# Patient Record
Sex: FEMALE | Race: WHITE | NOT HISPANIC OR LATINO | URBAN - METROPOLITAN AREA
[De-identification: names, ages, dates, MRNs, and addresses within clinical notes are randomized per-mention and may not be internally consistent; named-entity substitution may affect disease eponyms.]

---

## 2023-04-22 LAB
ALANINE AMINOTRANSFERASE (SGPT) (U/L) IN SER/PLAS: 16 U/L (ref 7–45)
ALBUMIN (G/DL) IN SER/PLAS: 4.2 G/DL (ref 3.4–5)
ALKALINE PHOSPHATASE (U/L) IN SER/PLAS: 51 U/L (ref 33–110)
ANION GAP IN SER/PLAS: 14 MMOL/L (ref 10–20)
ASPARTATE AMINOTRANSFERASE (SGOT) (U/L) IN SER/PLAS: 16 U/L (ref 9–39)
BILIRUBIN TOTAL (MG/DL) IN SER/PLAS: 0.7 MG/DL (ref 0–1.2)
CALCIDIOL (25 OH VITAMIN D3) (NG/ML) IN SER/PLAS: 16 NG/ML
CALCIUM (MG/DL) IN SER/PLAS: 9.1 MG/DL (ref 8.6–10.3)
CARBON DIOXIDE, TOTAL (MMOL/L) IN SER/PLAS: 24 MMOL/L (ref 21–32)
CHLORIDE (MMOL/L) IN SER/PLAS: 104 MMOL/L (ref 98–107)
CHOLESTEROL (MG/DL) IN SER/PLAS: 157 MG/DL (ref 0–199)
CHOLESTEROL IN HDL (MG/DL) IN SER/PLAS: 44.6 MG/DL
CHOLESTEROL/HDL RATIO: 3.5
CREATININE (MG/DL) IN SER/PLAS: 0.87 MG/DL (ref 0.5–1.05)
ERYTHROCYTE DISTRIBUTION WIDTH (RATIO) BY AUTOMATED COUNT: 13.8 % (ref 11.5–14.5)
ERYTHROCYTE MEAN CORPUSCULAR HEMOGLOBIN CONCENTRATION (G/DL) BY AUTOMATED: 31 G/DL (ref 32–36)
ERYTHROCYTE MEAN CORPUSCULAR VOLUME (FL) BY AUTOMATED COUNT: 82 FL (ref 80–100)
ERYTHROCYTES (10*6/UL) IN BLOOD BY AUTOMATED COUNT: 4.94 X10E12/L (ref 4–5.2)
GFR FEMALE: 85 ML/MIN/1.73M2
GLUCOSE (MG/DL) IN SER/PLAS: 89 MG/DL (ref 74–99)
HEMATOCRIT (%) IN BLOOD BY AUTOMATED COUNT: 40.7 % (ref 36–46)
HEMOGLOBIN (G/DL) IN BLOOD: 12.6 G/DL (ref 12–16)
LDL: 88 MG/DL (ref 0–99)
LEUKOCYTES (10*3/UL) IN BLOOD BY AUTOMATED COUNT: 8.3 X10E9/L (ref 4.4–11.3)
PLATELETS (10*3/UL) IN BLOOD AUTOMATED COUNT: 273 X10E9/L (ref 150–450)
POTASSIUM (MMOL/L) IN SER/PLAS: 3.7 MMOL/L (ref 3.5–5.3)
PROTEIN TOTAL: 7 G/DL (ref 6.4–8.2)
SODIUM (MMOL/L) IN SER/PLAS: 138 MMOL/L (ref 136–145)
THYROTROPIN (MIU/L) IN SER/PLAS BY DETECTION LIMIT <= 0.05 MIU/L: 4.48 MIU/L (ref 0.44–3.98)
TRIGLYCERIDE (MG/DL) IN SER/PLAS: 122 MG/DL (ref 0–149)
UREA NITROGEN (MG/DL) IN SER/PLAS: 14 MG/DL (ref 6–23)
VLDL: 24 MG/DL (ref 0–40)

## 2023-04-23 LAB
ALLERGEN ANIMAL: CAT DANDER IGE (KU/L): <0.1 KU/L
ALLERGEN ANIMAL: DOG DANDER IGE (KU/L): <0.1 KU/L
ALLERGEN GRASS: BERMUDA GRASS (CYNODON DACTYLON) IGE (KU/L): <0.1 KU/L
ALLERGEN GRASS: JOHNSON GRASS (SORGHUM HALEPENSE) IGE (KU/L): <0.1 KU/L
ALLERGEN GRASS: MEADOW GRASS, KENTUCKY BLUE (POA PRATENSIS )IGE (KU/L): <0.1 KU/L
ALLERGEN GRASS: TIMOTHY GRASS (PHLEUM PRATENSE) IGE (KU/L): <0.1 KU/L
ALLERGEN INSECT: COCKROACH IGE: <0.1 KU/L
ALLERGEN MICROORGANISM: ALTERNARIA ALTERNATA IGE (KU/L): <0.1 KU/L
ALLERGEN MICROORGANISM: ASPERGILLUS FUMIGATUS IGE (KU/L): <0.1 KU/L
ALLERGEN MICROORGANISM: CLADOSPORIUM HERBARUM IGE (KU/L): <0.1 KU/L
ALLERGEN MICROORGANISM: PENICILLIUM CHRYSOGENUM (P. NOTATUM) IGE (KU/L): <0.1 KU/L
ALLERGEN MITE: DERMATOPHAGOIDES FARINAE (HOUSE DUST MITE) IGE (KU/L): <0.1 KU/L
ALLERGEN MITE: DERMATOPHAGOIDES PTERONYSSINUS (HOUSE DUST MITE) IGE (KU/L): <0.1 KU/L
ALLERGEN TREE: BOX-ELDER (ACER NEGUNDO) IGE (KU/L): <0.1 KU/L
ALLERGEN TREE: COMMON SILVER BIRCH (BETULA VERRUCOSA) IGE (KU/L): <0.1 KU/L
ALLERGEN TREE: COTTONWOOD (POPULUS DELTOIDES) IGE (KU/L): <0.1 KU/L
ALLERGEN TREE: ELM (ULMUS AMERICANA) IGE (KU/L): <0.1 KU/L
ALLERGEN TREE: MAPLE LEAF SYCAMORE, LONDON PLANE IGE (KU/L): <0.1 KU/L
ALLERGEN TREE: MOUNTAIN JUNIPER (JUNIPERUS SABINOIDES) IGE (KU/L): <0.1 KU/L
ALLERGEN TREE: MULBERRY (MORUS ALBA) IGE (KU/L): <0.1 KU/L
ALLERGEN TREE: OAK (QUERCUS ALBA) IGE (KU/L): <0.1 KU/L
ALLERGEN TREE: PECAN, HICKORY (CARYA PECAN) IGE (KU/L): <0.1 KU/L
ALLERGEN TREE: WALNUT IGE: <0.1 KU/L
ALLERGEN TREE: WHITE ASH (FRAXINUS AMERICANA) IGE (KU/L): <0.1 KU/L
ALLERGEN WEED: COMMON PIGWEED (AMARANTHUS RETROFLEXUS) IGE (KU/L): <0.1 KU/L
ALLERGEN WEED: COMMON RAGWEED (AMB. ARTEMISIIFOLIA/A. ELATIOR) IGE (KU/L): <0.1 KU/L
ALLERGEN WEED: GOOSEFOOT, LAMB'S QUARTERS (CHENOPODIUM ALBUM) IGE (KU/L): <0.1 KU/L
ALLERGEN WEED: PLANTAIN (ENGLISH), RIBWORT (PLANTAGO LANCEOLATA) IGE (KU/L): <0.1 KU/L
ALLERGEN WEED: PRICKLY SALTWORT/RUSSIAN THISTLE (SALSOLA KALI) IGE (KU/L): <0.1 KU/L
ALLERGEN WEED: SHEEP SORREL (RUMEX ACETOSELLA) IGE (KU/L): <0.1 KU/L
IMMUNOCAP IGE: 40.9 KU/L (ref 0–214)
IMMUNOCAP INTERPRETATION: NORMAL

## 2023-10-31 PROBLEM — J45.998: Status: ACTIVE | Noted: 2023-10-31

## 2023-10-31 PROBLEM — E55.9 VITAMIN D DEFICIENCY: Status: ACTIVE | Noted: 2023-10-31

## 2023-10-31 PROBLEM — E66.01 MORBID OBESITY WITH BMI OF 45.0-49.9, ADULT (MULTI): Status: ACTIVE | Noted: 2023-10-31

## 2023-10-31 PROBLEM — F33.9 DEPRESSION, MAJOR, RECURRENT (CMS-HCC): Status: ACTIVE | Noted: 2023-10-31

## 2023-10-31 RX ORDER — SERTRALINE HYDROCHLORIDE 50 MG/1
50 TABLET, FILM COATED ORAL DAILY
COMMUNITY
End: 2024-03-20

## 2023-10-31 RX ORDER — ACETAMINOPHEN 500 MG
2000 TABLET ORAL DAILY
COMMUNITY

## 2023-10-31 RX ORDER — LORATADINE 10 MG
10 TABLET,DISINTEGRATING ORAL DAILY
COMMUNITY
End: 2024-04-23 | Stop reason: WASHOUT

## 2023-11-01 ENCOUNTER — OFFICE VISIT (OUTPATIENT)
Dept: PRIMARY CARE | Facility: CLINIC | Age: 43
End: 2023-11-01
Payer: COMMERCIAL

## 2023-11-01 VITALS
BODY MASS INDEX: 44.41 KG/M2 | HEART RATE: 96 BPM | DIASTOLIC BLOOD PRESSURE: 80 MMHG | HEIGHT: 68 IN | TEMPERATURE: 98 F | SYSTOLIC BLOOD PRESSURE: 128 MMHG | WEIGHT: 293 LBS

## 2023-11-01 DIAGNOSIS — J30.9 ALLERGIC RHINITIS, UNSPECIFIED SEASONALITY, UNSPECIFIED TRIGGER: ICD-10-CM

## 2023-11-01 DIAGNOSIS — N30.00 ACUTE CYSTITIS WITHOUT HEMATURIA: ICD-10-CM

## 2023-11-01 DIAGNOSIS — R39.9 UTI SYMPTOMS: Primary | ICD-10-CM

## 2023-11-01 DIAGNOSIS — Q62.8 CONGENITAL ABNORMALITY OF URETER: ICD-10-CM

## 2023-11-01 DIAGNOSIS — Z87.440 HISTORY OF RECURRENT UTIS: ICD-10-CM

## 2023-11-01 LAB
POC APPEARANCE, URINE: CLEAR
POC BILIRUBIN, URINE: NEGATIVE
POC BLOOD, URINE: NEGATIVE
POC COLOR, URINE: YELLOW
POC GLUCOSE, URINE: NEGATIVE MG/DL
POC KETONES, URINE: NEGATIVE MG/DL
POC LEUKOCYTES, URINE: ABNORMAL
POC NITRITE,URINE: NEGATIVE
POC PH, URINE: 5 PH
POC PROTEIN, URINE: ABNORMAL MG/DL
POC SPECIFIC GRAVITY, URINE: 1.01
POC UROBILINOGEN, URINE: 0.2 EU/DL

## 2023-11-01 PROCEDURE — 87086 URINE CULTURE/COLONY COUNT: CPT

## 2023-11-01 PROCEDURE — 81002 URINALYSIS NONAUTO W/O SCOPE: CPT | Performed by: STUDENT IN AN ORGANIZED HEALTH CARE EDUCATION/TRAINING PROGRAM

## 2023-11-01 PROCEDURE — 1036F TOBACCO NON-USER: CPT | Performed by: STUDENT IN AN ORGANIZED HEALTH CARE EDUCATION/TRAINING PROGRAM

## 2023-11-01 PROCEDURE — 99214 OFFICE O/P EST MOD 30 MIN: CPT | Performed by: STUDENT IN AN ORGANIZED HEALTH CARE EDUCATION/TRAINING PROGRAM

## 2023-11-01 ASSESSMENT — ENCOUNTER SYMPTOMS
OCCASIONAL FEELINGS OF UNSTEADINESS: 0
DEPRESSION: 0
LOSS OF SENSATION IN FEET: 0

## 2023-11-01 ASSESSMENT — PATIENT HEALTH QUESTIONNAIRE - PHQ9
SUM OF ALL RESPONSES TO PHQ9 QUESTIONS 1 AND 2: 0
2. FEELING DOWN, DEPRESSED OR HOPELESS: NOT AT ALL
1. LITTLE INTEREST OR PLEASURE IN DOING THINGS: NOT AT ALL

## 2023-11-01 NOTE — PROGRESS NOTES
"Subjective   Patient ID: Emelia Aponte is a 42 y.o. female who presents for UTI (Patient is here in office today with complaint of possible UTI, she has been experiencing frequency and urgency and bladder spasms since Sunday. Yaima has increased her water intake and is taking cranberry and pyridium ).    Denies fever, abd pain, n/v, back pain  Has history of ureter abnormality that has consistently caused reflux of urine and UTIs    Plan  Urine culture, wait to see if it grows anything  Referral to urology for second opinion'  Referral to allergy  Follow-up with results    UTI        Review of Systems    Objective   /80 (BP Location: Left arm, Patient Position: Sitting, BP Cuff Size: Adult)   Pulse 96   Temp 36.7 °C (98 °F) (Temporal)   Ht 1.727 m (5' 8\")   Wt 136 kg (299 lb)   BMI 45.46 kg/m²     Physical Exam  Vitals reviewed.   Constitutional:       General: She is not in acute distress.     Appearance: Normal appearance. She is not ill-appearing or toxic-appearing.   HENT:      Head: Atraumatic.      Mouth/Throat:      Mouth: Mucous membranes are moist.      Pharynx: Oropharynx is clear.   Eyes:      Extraocular Movements: Extraocular movements intact.      Conjunctiva/sclera: Conjunctivae normal.      Pupils: Pupils are equal, round, and reactive to light.   Cardiovascular:      Rate and Rhythm: Normal rate and regular rhythm.      Pulses: Normal pulses.      Heart sounds: Normal heart sounds. No murmur heard.  Pulmonary:      Effort: Pulmonary effort is normal. No respiratory distress.      Breath sounds: Normal breath sounds.   Abdominal:      General: Abdomen is flat.      Palpations: Abdomen is soft.      Tenderness: There is no abdominal tenderness.   Musculoskeletal:      Right lower leg: No edema.      Left lower leg: No edema.   Skin:     General: Skin is warm and dry.      Capillary Refill: Capillary refill takes less than 2 seconds.      Findings: No rash.   Neurological:      General: " No focal deficit present.      Mental Status: She is alert.      Cranial Nerves: No cranial nerve deficit.      Gait: Gait normal.   Psychiatric:         Mood and Affect: Mood normal.         Behavior: Behavior normal.       Assessment/Plan   Problem List Items Addressed This Visit    None  Visit Diagnoses         Codes    UTI symptoms    -  Primary R39.9    Relevant Orders    POCT UA (nonautomated) manually resulted

## 2023-11-02 LAB — BACTERIA UR CULT: NORMAL

## 2023-11-13 ENCOUNTER — OFFICE VISIT (OUTPATIENT)
Dept: UROLOGY | Facility: CLINIC | Age: 43
End: 2023-11-13
Payer: COMMERCIAL

## 2023-11-13 VITALS
WEIGHT: 290 LBS | DIASTOLIC BLOOD PRESSURE: 85 MMHG | SYSTOLIC BLOOD PRESSURE: 127 MMHG | TEMPERATURE: 97.7 F | BODY MASS INDEX: 43.95 KG/M2 | HEIGHT: 68 IN | HEART RATE: 76 BPM

## 2023-11-13 DIAGNOSIS — Z87.448 HISTORY OF URINARY REFLUX: ICD-10-CM

## 2023-11-13 DIAGNOSIS — Z87.440 HISTORY OF RECURRENT UTIS: Primary | ICD-10-CM

## 2023-11-13 DIAGNOSIS — N39.3 SUI (STRESS URINARY INCONTINENCE, FEMALE): ICD-10-CM

## 2023-11-13 DIAGNOSIS — Q62.8 CONGENITAL ABNORMALITY OF URETER: ICD-10-CM

## 2023-11-13 LAB
APPEARANCE UR: CLEAR
BACTERIA #/AREA URNS AUTO: ABNORMAL /HPF
BILIRUB UR STRIP.AUTO-MCNC: NEGATIVE MG/DL
COLOR UR: ABNORMAL
GLUCOSE UR STRIP.AUTO-MCNC: NEGATIVE MG/DL
KETONES UR STRIP.AUTO-MCNC: NEGATIVE MG/DL
LEUKOCYTE ESTERASE UR QL STRIP.AUTO: ABNORMAL
NITRITE UR QL STRIP.AUTO: NEGATIVE
PH UR STRIP.AUTO: 6 [PH]
POC APPEARANCE, URINE: CLEAR
POC BILIRUBIN, URINE: NEGATIVE
POC BLOOD, URINE: ABNORMAL
POC COLOR, URINE: YELLOW
POC GLUCOSE, URINE: NEGATIVE MG/DL
POC KETONES, URINE: NEGATIVE MG/DL
POC LEUKOCYTES, URINE: NEGATIVE
POC NITRITE,URINE: NEGATIVE
POC PH, URINE: 6 PH
POC PROTEIN, URINE: NEGATIVE MG/DL
POC SPECIFIC GRAVITY, URINE: 1.01
POC UROBILINOGEN, URINE: 0.2 EU/DL
PROT UR STRIP.AUTO-MCNC: NEGATIVE MG/DL
RBC # UR STRIP.AUTO: NEGATIVE /UL
RBC #/AREA URNS AUTO: ABNORMAL /HPF
SP GR UR STRIP.AUTO: 1.01
SQUAMOUS #/AREA URNS AUTO: ABNORMAL /HPF
UROBILINOGEN UR STRIP.AUTO-MCNC: <2 MG/DL
WBC #/AREA URNS AUTO: ABNORMAL /HPF

## 2023-11-13 PROCEDURE — 81003 URINALYSIS AUTO W/O SCOPE: CPT | Performed by: NURSE PRACTITIONER

## 2023-11-13 PROCEDURE — 99204 OFFICE O/P NEW MOD 45 MIN: CPT | Performed by: NURSE PRACTITIONER

## 2023-11-13 PROCEDURE — 51798 US URINE CAPACITY MEASURE: CPT | Performed by: NURSE PRACTITIONER

## 2023-11-13 PROCEDURE — 1036F TOBACCO NON-USER: CPT | Performed by: NURSE PRACTITIONER

## 2023-11-13 PROCEDURE — 81001 URINALYSIS AUTO W/SCOPE: CPT

## 2023-11-13 NOTE — PROGRESS NOTES
"11/13/23   [id number]    Double ureter, congenital reflux     Subjective      HPI Emelia Aponte is a 42 y.o. female who presents for double ureter, congenital reflux; hx partial nephrectomy at age 16; Hx recurrent UTIs but none in past 2 years; sx w UTIs more subtle w urgency and bladder spasms; Recently experienced one day of urgency, saw primary care, urine negative for infection, she had drank a lot of water and cranberry perhaps flushed it out;     Saw Dr. Barragan and Dr. Ingram in the past many IVP, MACY and cystoscopy in past per patient; most recently told the ureters had fused nothing could be done at this time; she wanted second opinion for monitoring;     No OAB, DTF 5-6 x. NTF 1 x. No hematuria, occasional ALTON if cough hard, seeing allergist to address upper respiratory symptoms; not bother some enough for treatment; no constipation;     Not sexually active, neg HSDD;  perimenopausal, heavy menses now but still regular, no burning;     FH mother cervical ca, father passed from testicular ca  SH: quit smoking April 2022, smoked < 1/2 ppd x 20 yrs;       Creatinine 0.87        Objective     /85 (BP Location: Right arm, Patient Position: Sitting, BP Cuff Size: Large adult)   Pulse 76   Temp 36.5 °C (97.7 °F) (Temporal)   Ht 1.727 m (5' 8\")   Wt 132 kg (290 lb)   BMI 44.09 kg/m²    Physical Exam    General: Appears comfortable and in no apparent distress, well nourished  Head: Normocephalic, atraumatic  Neck: trachea midline  Respiratory: respirations unlabored, no wheezes, and no use of accessory muscles  Cardiovascular: at rest no dyspnea, well perfused  Skin: no visible rashes or lesions  Neurologic: grossly intact, oriented to person, place, and time  Psychiatric: mood and affect appropriate  Musculoskeletal: in chair for appt. no difficulty w upper body movement    Assessment/Plan   Problem List Items Addressed This Visit    None  Visit Diagnoses       History of recurrent UTIs    -  Primary    " Relevant Orders    POCT UA Automated manually resulted (Completed)    Post-Void Residual (Completed)    Urinalysis with Reflex Microscopic    Congenital abnormality of ureter        Relevant Orders    POCT UA Automated manually resulted (Completed)    Urinalysis with Reflex Microscopic          Orders Placed This Encounter   Procedures    Post-Void Residual    Urinalysis with Reflex Microscopic     Standing Status:   Future     Number of Occurrences:   1     Standing Expiration Date:   11/13/2024     Order Specific Question:   Release result to MyChart     Answer:   Immediate [1]    POCT UA Automated manually resulted     Order Specific Question:   Release result to MyChart     Answer:   Immediate [1]         Discussed UTI prevention, drinking fluids, Dmanose   eventually as she moves thru perimenopause, consider estrogen vaginal cream    Discussed treatment options Stress urinary incontinence, kegels, pelvic floor PT, pessary, bulking agent, sling  Not bothersome enough for treatment at this time    Discussed double ureter, per patient fused to other ureter w clip now;   hx reflux, has not had recent imaging, will arrange appt. W Dr. Ontiveros discuss best imaging a  Many IVP and MACY, cystoscopy in past per patient, would prefer not to be poked and prodded per patient,     Nurse line [phone]      Renée Matta, APRN-CNP  Lab Results   Component Value Date    GLUCOSE 89 04/22/2023    CALCIUM 9.1 04/22/2023     04/22/2023    K 3.7 04/22/2023    CO2 24 04/22/2023     04/22/2023    BUN 14 04/22/2023    CREATININE 0.87 04/22/2023

## 2023-11-13 NOTE — PATIENT INSTRUCTIONS
Discussed UTI prevention, drinking fluids, Dmanose   eventually as she moves thru perimenopause, consider estrogen vaginal cream    Discussed treatment options Stress urinary incontinence, kegels, pelvic floor PT, pessary, bulking agent, sling  Not bothersome enough for treatment at this time    Discussed double ureter, per patient fused to other ureter w clip now;   hx reflux, has not had recent imaging, will arrange appt. W Dr. Ontiveros discuss best imaging a  Many IVP and MACY, cystoscopy in past per patient, would prefer not to be poked and prodded per patient,     Nurse line [phone]

## 2023-11-13 NOTE — Clinical Note
Please review, let me know if you want any particular testing before she sees you, she has been poked prodded a bit and wants to make sure best test ordered. ty

## 2023-11-14 ENCOUNTER — TELEPHONE (OUTPATIENT)
Dept: UROLOGY | Facility: CLINIC | Age: 43
End: 2023-11-14
Payer: COMMERCIAL

## 2023-11-14 NOTE — TELEPHONE ENCOUNTER
----- Message from JEFFERY Blevins sent at 11/14/2023  8:03 AM EST -----  Please let her know, no hematuria, had dipped in office but neg at hospital.    ----- Message -----  From: Malvin Johnson MA  Sent: 11/13/2023   3:04 PM EST  To: JEFFERY Blevins

## 2023-12-20 ENCOUNTER — APPOINTMENT (OUTPATIENT)
Dept: UROLOGY | Facility: CLINIC | Age: 43
End: 2023-12-20
Payer: COMMERCIAL

## 2024-02-05 ENCOUNTER — CONSULT (OUTPATIENT)
Dept: ALLERGY | Facility: CLINIC | Age: 44
End: 2024-02-05
Payer: COMMERCIAL

## 2024-02-05 VITALS — OXYGEN SATURATION: 97 % | WEIGHT: 293 LBS | BODY MASS INDEX: 44.55 KG/M2 | HEART RATE: 80 BPM

## 2024-02-05 DIAGNOSIS — R06.2 WHEEZE: Primary | ICD-10-CM

## 2024-02-05 DIAGNOSIS — J30.9 ALLERGIC RHINITIS, UNSPECIFIED SEASONALITY, UNSPECIFIED TRIGGER: ICD-10-CM

## 2024-02-05 LAB
FEV1/FVC: 101 %
FEV1: 76 LITERS
FVC: 74 LITERS

## 2024-02-05 PROCEDURE — 99204 OFFICE O/P NEW MOD 45 MIN: CPT | Performed by: ALLERGY & IMMUNOLOGY

## 2024-02-05 PROCEDURE — 94060 EVALUATION OF WHEEZING: CPT | Performed by: ALLERGY & IMMUNOLOGY

## 2024-02-05 PROCEDURE — 95004 PERQ TESTS W/ALRGNC XTRCS: CPT | Performed by: ALLERGY & IMMUNOLOGY

## 2024-02-05 PROCEDURE — 95024 IQ TESTS W/ALLERGENIC XTRCS: CPT | Performed by: ALLERGY & IMMUNOLOGY

## 2024-02-05 RX ORDER — MONTELUKAST SODIUM 10 MG/1
10 TABLET ORAL DAILY
Qty: 30 TABLET | Refills: 11 | Status: SHIPPED | OUTPATIENT
Start: 2024-02-05 | End: 2025-02-04

## 2024-02-05 RX ORDER — HYDROXYZINE HYDROCHLORIDE 25 MG/1
25 TABLET, FILM COATED ORAL DAILY PRN
COMMUNITY
Start: 2022-08-31 | End: 2024-04-23 | Stop reason: WASHOUT

## 2024-02-05 RX ORDER — MOMETASONE FUROATE 50 UG/1
2 SPRAY, METERED NASAL 2 TIMES DAILY
Qty: 17 G | Refills: 5 | Status: SHIPPED | OUTPATIENT
Start: 2024-02-05 | End: 2025-02-04

## 2024-02-05 NOTE — PROGRESS NOTES
"    Subjective   Patient ID:   [id number]   Emelia Aponte is a 43 y.o. female who presents for Allergies (Pt had allergy bw done 4/2023 by ), Cough, Breathing Problem (NPV), Sinus Problem, and Sinusitis.    Chief Complaint   Patient presents with    Allergies     Pt had allergy bw done 4/2023 by     Cough    Breathing Problem     NPV    Sinus Problem    Sinusitis      This is a new patient presenting for evaluation of allergy Sx     Patient states she has suffered from chronic nasal congestion for years.  About 15 years ago, she lived in a home with mold and told to take medications, which did not help.  She moved and Sx persist.  Dec 2022, she developed a URI and since then, she has lung issues including SOB and wheeze on inhalation and expiration.  She notes in October, November, December and January, she had \"sinus infections\" and green nasal phlegm with nose-blowing.  She was given prednisone x5 days, azelastine and doxycycline x1 week the end of last month.  Patient notes she was typically good for 2 weeks but Sx recur.  She stopped the azelastine because it makes her sneeze.   She has 3 cats and a dog and had a RAP 04- that was negative.  She has persistent intermittent wheeze at night when she lies down.    Patient saw her PCP who ordered a PFT she could not afford so she was prescribed albuterol 2 inhalations prior to activity and as needed.  She states she cannot even walk her dog.  She uses the albuterol about 2-3x daily as prevention.    Patient has not had cultures or scans but had a negative CXR recently.  She has intermittent indigestion but not a true heartburn.  She notes FMHx of COPD and asthma.  She wonders if she may have EIA and concerned because she is a former smoker.    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    Review of Systems    Objective     Pulse 80   Wt 133 kg (293 lb)   SpO2 97%   BMI 44.55 kg/m²      Physical Exam  Constitutional:       Appearance: Normal appearance. "   HENT:      Head: Normocephalic and atraumatic.      Right Ear: External ear normal. There is no impacted cerumen.      Left Ear: External ear normal. There is no impacted cerumen.      Nose: Congestion (bilateral nasal turbinate edema, right greater than left) present. No rhinorrhea.   Eyes:      Extraocular Movements: Extraocular movements intact.      Conjunctiva/sclera: Conjunctivae normal.      Pupils: Pupils are equal, round, and reactive to light.   Cardiovascular:      Rate and Rhythm: Normal rate and regular rhythm.      Heart sounds: No murmur heard.     No friction rub. No gallop.   Pulmonary:      Effort: No respiratory distress.      Breath sounds: No wheezing, rhonchi or rales.   Skin:     General: Skin is warm and dry.   Neurological:      Mental Status: She is alert.   Psychiatric:         Mood and Affect: Mood normal.         Behavior: Behavior normal.     Pulmonary Functions Testing Results:    FEV1   Date Value Ref Range Status   2024 76 liters Final     FVC   Date Value Ref Range Status   2024 74 liters Final     FEV1/FVC   Date Value Ref Range Status   2024 101 % Final      Allergy testing was performed on Emelia Aponte using standard technique. There were no immediate complications.    Test Administration Information  Test Information  Consent: Yes  Location: Arm  Testing Nurse: SRI  Reviewing Physician: TULIO  Results: Qualitative  Select Antigens: Select    Test Results  Controls  Positive Histamine: 5 x 5  Negative Saline: 0  Panel 1  Cat: 0  Cockroach: 0  Cotton: 0  Do  D. Farinae: 0  D. Pter: 0  Feather: 0  Phoma: 0  Tree Panel  Devyn: 0  Beech: 0  Birch: 0  Beckham: 0  [place]: 0  Maple: 0  [place]: 0  Pine: 0  [place]: 0  Grass/Misc Tree  [place]: 0  Black [place]: 0  Raymond: 0  [place] Blue: 0  [place] Fescue: 0  Orchard: 0  Red Top: 0  Rye Grass: 0  Sweet Vernal: 0  Gerton: 0  Weeds  Cocklebur: 0  Common Mugwort: 0  English Plantain: 0  Hemp: 0  Kochia: 0  Lamb's  Quarter: 0  Marsh Elder: 0  Pigweed: 0  Nettle: 0  Ragweed: 0  Molds  Alternaria: 0  Aspergillus: 0  Aureobasid: 0  Bipolaris: 0  Cladosporidium: 0  Epicoccum: 0  Fusarium: 0  Geotrichum: 0  Helminthosporium: 0  Penicillium: 0    Intradermal allergy testing was performed on Emelia Aponte using standard technique. There were no immediate complications.    Test Administration Information  Test Information  Results: Qualitative  Select Antigens: Select    Test Results  Controls  Intradermal Saline: 0  ID  Cat: 0  Do  Mite Mix: 0  Mold Mix #1: 0  Mold Mix #2: 0      Assessment/Plan     Wheeze  IO PFT today shows no significant change post bronchodilator.    Start Singulair at bedtime to help decrease sinus and lung inflammation.  Side effects reviewed.          By signing my name below, I, Daria Lomeli, attest that this documentation has been prepared under the direction and in the presence of Love Espinoza MD.  All medical record entries made by the Scribe were at my direction and personally dictated by me. I have reviewed the chart and agree that the record accurately reflects my personal performance of the history, physical exam, discussion and plan.

## 2024-02-05 NOTE — ASSESSMENT & PLAN NOTE
IO PFT today shows no significant change post bronchodilator.  Her PFT has a lower than usual FEV1 and FVC.  This may be due to body habitus or even effort.  If her symptoms persist despite being on montelukast and albuterol then I will order a chest x-ray followed by possibly a CT scan of her chest to evaluate for any interstitial problems.    Start Singulair at bedtime to help decrease sinus and lung inflammation.  Side effects reviewed.

## 2024-02-05 NOTE — PATIENT INSTRUCTIONS
Skin testing is mildly positive to dust mite.    Continue albuterol as needed.    Start Singulair at bedtime to help decrease sinus and lung inflammation.  Side effects reported vivid dreams,  mood changes as well as ear popping.  If you experience ear popping, this indicates the medication is working so please continue to take it.     If congestion persists after a month, start over the Nasonex.  To increase the efficacy of your nasal spray, be sure to look down while using it.? Spray slightly away from the direction of your nasal septum (the bone in the middle of your nose) and only sniff after you have sprayed - avoid spraying and sniffing at the same time, or else a lot of spray will go down your throat.      If after above regimen, congestion persists, we will consider evaluation by an otolaryngologist or CT sinuses.      Follow up in 2-3 months or sooner if symptoms worsen prior.

## 2024-02-12 NOTE — ASSESSMENT & PLAN NOTE
She is very mildly atopic.  I am unsure how much her allergies are actually contributing to her symptoms.  Despite being on Flonase and azelastine she is complaining of chronic nasal congestion.  Therefore I think that most likely a structural problem is possible.  I would like her to start montelukast at bedtime.  If congestion persists after a month,  start OTC Nasonex.  I am varying the start date of these medications so I am aware of what medications are working for her and to what extent.    If after above regimen, congestion persists, we will consider evaluation by an otolaryngologist or CT sinuses.  Once again structural abnormality is high on my list due to her symptoms resistance to medications

## 2024-03-19 DIAGNOSIS — F33.9 RECURRENT MAJOR DEPRESSIVE DISORDER, REMISSION STATUS UNSPECIFIED (CMS-HCC): ICD-10-CM

## 2024-03-20 RX ORDER — SERTRALINE HYDROCHLORIDE 50 MG/1
50 TABLET, FILM COATED ORAL DAILY
Qty: 90 TABLET | Refills: 0 | Status: SHIPPED | OUTPATIENT
Start: 2024-03-20 | End: 2024-04-23 | Stop reason: SDUPTHER

## 2024-04-15 ENCOUNTER — APPOINTMENT (OUTPATIENT)
Dept: ALLERGY | Facility: CLINIC | Age: 44
End: 2024-04-15
Payer: COMMERCIAL

## 2024-04-23 ENCOUNTER — OFFICE VISIT (OUTPATIENT)
Dept: PRIMARY CARE | Facility: CLINIC | Age: 44
End: 2024-04-23
Payer: COMMERCIAL

## 2024-04-23 VITALS
HEIGHT: 68 IN | DIASTOLIC BLOOD PRESSURE: 74 MMHG | HEART RATE: 90 BPM | SYSTOLIC BLOOD PRESSURE: 120 MMHG | WEIGHT: 293 LBS | BODY MASS INDEX: 44.41 KG/M2 | OXYGEN SATURATION: 96 % | TEMPERATURE: 97.9 F

## 2024-04-23 DIAGNOSIS — E55.9 VITAMIN D INSUFFICIENCY: ICD-10-CM

## 2024-04-23 DIAGNOSIS — Z12.31 SCREENING MAMMOGRAM FOR BREAST CANCER: ICD-10-CM

## 2024-04-23 DIAGNOSIS — R39.9 URINARY TRACT INFECTION SYMPTOMS: ICD-10-CM

## 2024-04-23 DIAGNOSIS — F33.9 RECURRENT MAJOR DEPRESSIVE DISORDER, REMISSION STATUS UNSPECIFIED (CMS-HCC): ICD-10-CM

## 2024-04-23 DIAGNOSIS — I10 ESSENTIAL (PRIMARY) HYPERTENSION: ICD-10-CM

## 2024-04-23 DIAGNOSIS — E78.2 MIXED DYSLIPIDEMIA: ICD-10-CM

## 2024-04-23 DIAGNOSIS — Z00.00 ENCOUNTER FOR WELLNESS EXAMINATION: Primary | ICD-10-CM

## 2024-04-23 DIAGNOSIS — R94.6 BORDERLINE ABNORMAL TFTS: ICD-10-CM

## 2024-04-23 PROBLEM — J45.998: Status: RESOLVED | Noted: 2023-10-31 | Resolved: 2024-04-23

## 2024-04-23 PROCEDURE — 3078F DIAST BP <80 MM HG: CPT | Performed by: INTERNAL MEDICINE

## 2024-04-23 PROCEDURE — 3074F SYST BP LT 130 MM HG: CPT | Performed by: INTERNAL MEDICINE

## 2024-04-23 PROCEDURE — 99396 PREV VISIT EST AGE 40-64: CPT | Performed by: INTERNAL MEDICINE

## 2024-04-23 RX ORDER — SERTRALINE HYDROCHLORIDE 50 MG/1
50 TABLET, FILM COATED ORAL DAILY
Qty: 90 TABLET | Refills: 1 | Status: SHIPPED | OUTPATIENT
Start: 2024-04-23

## 2024-04-23 ASSESSMENT — ENCOUNTER SYMPTOMS
DEPRESSION: 0
OCCASIONAL FEELINGS OF UNSTEADINESS: 0
LOSS OF SENSATION IN FEET: 0

## 2024-04-23 NOTE — PROGRESS NOTES
"Emelia Aponte is otherwise doing well. Chronic medical conditions are stable with current medical regimen. Cognitive functions are intact and stable. Immunizations are age appropriately up-to-date. Today patient does not have any acute medical complaint. We reconciled home medications. . Discussed about the preventative richie like cancer screening, routine blood work, immunizations. The healthy lifestyle has been reinforced. Encouraged continued avoidance of tobacco alcohol substances of abuse. Functional capacity has been assessed. The depression screening questionnaire has been reviewed. Discussed safety measures and advanced directives, Med refills were sent.  Vital signs reviewed.  The due lab orders, if any were provided.  All the other components of annual wellness has been further narrated below. Patient will return for follow up as per schedule.     Review of Systems   Constitutional:  Negative for activity change and fever.   HENT:  Negative for hearing loss, nosebleeds and tinnitus.    Eyes:  Negative for redness.   Respiratory:  Negative for chest tightness and stridor.    Cardiovascular:  Negative for chest pain, palpitations and leg swelling.   Gastrointestinal:  Negative for blood in stool.   Endocrine: Negative for cold intolerance.   Genitourinary:  Negative for hematuria.   Musculoskeletal:  Negative for joint swelling.   Skin:  Negative for rash.   Neurological:  Negative for speech difficulty and light-headedness.   Psychiatric/Behavioral:  Negative for behavioral problems.        Visit Vitals  /74 (BP Location: Left arm, Patient Position: Sitting, BP Cuff Size: Adult)   Pulse 90   Temp 36.6 °C (97.9 °F) (Temporal)   Ht 1.727 m (5' 8\")   Wt 134 kg (296 lb)   SpO2 96%   BMI 45.01 kg/m²   Smoking Status Former   BSA 2.54 m²           Wt Readings from Last 10 Encounters:   04/23/24 134 kg (296 lb)   02/05/24 133 kg (293 lb)   11/13/23 132 kg (290 lb)   11/01/23 136 kg (299 lb)   09/28/23 135 " kg (298 lb 3.2 oz)   04/27/23 133 kg (293 lb)   04/20/23 132 kg (292 lb)        Physical Exam  Constitutional:       General: She is not in acute distress.     Appearance: Normal appearance.   HENT:      Head: Normocephalic.      Right Ear: Tympanic membrane normal.      Left Ear: Tympanic membrane normal.      Mouth/Throat:      Mouth: Mucous membranes are moist.   Cardiovascular:      Rate and Rhythm: Normal rate and regular rhythm.      Heart sounds: No murmur heard.  Pulmonary:      Effort: No respiratory distress.   Abdominal:      Palpations: Abdomen is soft.   Musculoskeletal:      Cervical back: Neck supple.      Right lower leg: No edema.      Left lower leg: No edema.   Skin:     Findings: No rash.   Neurological:      General: No focal deficit present.      Mental Status: She is alert and oriented to person, place, and time.   Psychiatric:         Mood and Affect: Mood normal.            RECENT LABS:  Lab Results   Component Value Date    WBC 8.3 04/22/2023    HGB 12.6 04/22/2023    HCT 40.7 04/22/2023     04/22/2023    CHOL 157 04/22/2023    TRIG 122 04/22/2023    HDL 44.6 04/22/2023    ALT 16 04/22/2023    AST 16 04/22/2023     04/22/2023    K 3.7 04/22/2023     04/22/2023    CREATININE 0.87 04/22/2023    BUN 14 04/22/2023    CO2 24 04/22/2023    TSH 4.48 (H) 04/22/2023       MEDICATIONS:   Current Outpatient Medications   Medication Instructions    albuterol 108 (90 Base) MCG/ACT inhaler 2 puffs, inhalation, 3 times daily RT    cholecalciferol (VITAMIN D3) 2,000 Units, oral, Daily    mometasone (Nasonex) 50 mcg/actuation nasal spray 2 sprays, Each Nostril, 2 times daily    montelukast (SINGULAIR) 10 mg, oral, Daily    sertraline (ZOLOFT) 50 mg, oral, Daily    vit A/vit C/vit E/zinc/copper (PRESERVISION AREDS ORAL) 1 tablet, oral, Daily        TODAY'S VISIT  DX:   1. Encounter for wellness examination  Comprehensive Metabolic Panel    Lipid Panel    TSH with reflex to Free T4 if  abnormal    Urinalysis with Reflex Microscopic      2. Recurrent major depressive disorder, remission status unspecified (CMS-HCC)  sertraline (Zoloft) 50 mg tablet      3. Screening mammogram for breast cancer  BI mammo bilateral screening tomosynthesis      4. Essential (primary) hypertension  CBC and Auto Differential      5. Mixed dyslipidemia  Lipid Panel      6. Vitamin D insufficiency  Vitamin D 25-Hydroxy,Total (for eval of Vitamin D levels)      7. Urinary tract infection symptoms  Urinalysis with Reflex Microscopic      8. Borderline abnormal TFTs  Triiodothyronine, Free             MEDICAL DECISION MAKING:   Recent lab work and relevant imaging studies have been reviewed.  Relevant correspondence/notes from specialty consultants were reviewed and discussed with patient.  The current active medical co morbidities have been considered.  Patient's cancer screening tests are up-to-date.  Medication have been sent for refill.  Patient will continue with current medical therapy and plan.  Immunizations are up-to-date.  Relevant orders are in the chart for this visit.  I will see patient back in 3 months.      PS: This note was completed using Dragon voice recognition technology and may include unintended errors with respect to translation of words, typographical errors or grammar errors which may not have been identified while finalizing the chart.

## 2024-04-25 ASSESSMENT — ENCOUNTER SYMPTOMS
FEVER: 0
BLOOD IN STOOL: 0
STRIDOR: 0
SPEECH DIFFICULTY: 0
HEMATURIA: 0
LIGHT-HEADEDNESS: 0
EYE REDNESS: 0
PALPITATIONS: 0
CHEST TIGHTNESS: 0
ACTIVITY CHANGE: 0
JOINT SWELLING: 0

## 2024-04-27 ENCOUNTER — LAB (OUTPATIENT)
Dept: LAB | Facility: LAB | Age: 44
End: 2024-04-27
Payer: COMMERCIAL

## 2024-04-27 DIAGNOSIS — R39.9 URINARY TRACT INFECTION SYMPTOMS: ICD-10-CM

## 2024-04-27 DIAGNOSIS — E78.2 MIXED DYSLIPIDEMIA: ICD-10-CM

## 2024-04-27 DIAGNOSIS — I10 ESSENTIAL (PRIMARY) HYPERTENSION: ICD-10-CM

## 2024-04-27 DIAGNOSIS — E55.9 VITAMIN D INSUFFICIENCY: ICD-10-CM

## 2024-04-27 DIAGNOSIS — Z00.00 ENCOUNTER FOR WELLNESS EXAMINATION: ICD-10-CM

## 2024-04-27 DIAGNOSIS — R94.6 BORDERLINE ABNORMAL TFTS: ICD-10-CM

## 2024-04-27 LAB
25(OH)D3 SERPL-MCNC: 36 NG/ML (ref 30–100)
ALBUMIN SERPL BCP-MCNC: 4 G/DL (ref 3.4–5)
ALP SERPL-CCNC: 43 U/L (ref 33–110)
ALT SERPL W P-5'-P-CCNC: 11 U/L (ref 7–45)
ANION GAP SERPL CALC-SCNC: 12 MMOL/L (ref 10–20)
APPEARANCE UR: ABNORMAL
AST SERPL W P-5'-P-CCNC: 12 U/L (ref 9–39)
BASOPHILS # BLD AUTO: 0.05 X10*3/UL (ref 0–0.1)
BASOPHILS NFR BLD AUTO: 0.6 %
BILIRUB SERPL-MCNC: 0.4 MG/DL (ref 0–1.2)
BILIRUB UR STRIP.AUTO-MCNC: NEGATIVE MG/DL
BUN SERPL-MCNC: 21 MG/DL (ref 6–23)
CALCIUM SERPL-MCNC: 8.7 MG/DL (ref 8.6–10.3)
CHLORIDE SERPL-SCNC: 107 MMOL/L (ref 98–107)
CHOLEST SERPL-MCNC: 156 MG/DL (ref 0–199)
CHOLESTEROL/HDL RATIO: 3.7
CO2 SERPL-SCNC: 25 MMOL/L (ref 21–32)
COLOR UR: YELLOW
CREAT SERPL-MCNC: 0.8 MG/DL (ref 0.5–1.05)
EGFRCR SERPLBLD CKD-EPI 2021: >90 ML/MIN/1.73M*2
EOSINOPHIL # BLD AUTO: 0.14 X10*3/UL (ref 0–0.7)
EOSINOPHIL NFR BLD AUTO: 1.8 %
ERYTHROCYTE [DISTWIDTH] IN BLOOD BY AUTOMATED COUNT: 14.9 % (ref 11.5–14.5)
GLUCOSE SERPL-MCNC: 92 MG/DL (ref 74–99)
GLUCOSE UR STRIP.AUTO-MCNC: NEGATIVE MG/DL
HCT VFR BLD AUTO: 38.7 % (ref 36–46)
HDLC SERPL-MCNC: 42.2 MG/DL
HGB BLD-MCNC: 12 G/DL (ref 12–16)
IMM GRANULOCYTES # BLD AUTO: 0.03 X10*3/UL (ref 0–0.7)
IMM GRANULOCYTES NFR BLD AUTO: 0.4 % (ref 0–0.9)
KETONES UR STRIP.AUTO-MCNC: NEGATIVE MG/DL
LDLC SERPL CALC-MCNC: 89 MG/DL
LEUKOCYTE ESTERASE UR QL STRIP.AUTO: NEGATIVE
LYMPHOCYTES # BLD AUTO: 1.75 X10*3/UL (ref 1.2–4.8)
LYMPHOCYTES NFR BLD AUTO: 22.4 %
MCH RBC QN AUTO: 24.9 PG (ref 26–34)
MCHC RBC AUTO-ENTMCNC: 31 G/DL (ref 32–36)
MCV RBC AUTO: 81 FL (ref 80–100)
MONOCYTES # BLD AUTO: 0.46 X10*3/UL (ref 0.1–1)
MONOCYTES NFR BLD AUTO: 5.9 %
NEUTROPHILS # BLD AUTO: 5.38 X10*3/UL (ref 1.2–7.7)
NEUTROPHILS NFR BLD AUTO: 68.9 %
NITRITE UR QL STRIP.AUTO: NEGATIVE
NON HDL CHOLESTEROL: 114 MG/DL (ref 0–149)
NRBC BLD-RTO: 0 /100 WBCS (ref 0–0)
PH UR STRIP.AUTO: 5 [PH]
PLATELET # BLD AUTO: 267 X10*3/UL (ref 150–450)
POTASSIUM SERPL-SCNC: 4.2 MMOL/L (ref 3.5–5.3)
PROT SERPL-MCNC: 6.3 G/DL (ref 6.4–8.2)
PROT UR STRIP.AUTO-MCNC: NEGATIVE MG/DL
RBC # BLD AUTO: 4.81 X10*6/UL (ref 4–5.2)
RBC # UR STRIP.AUTO: NEGATIVE /UL
SODIUM SERPL-SCNC: 140 MMOL/L (ref 136–145)
SP GR UR STRIP.AUTO: 1.02
T3FREE SERPL-MCNC: 3 PG/ML (ref 2.3–4.2)
TRIGL SERPL-MCNC: 122 MG/DL (ref 0–149)
TSH SERPL-ACNC: 3.3 MIU/L (ref 0.44–3.98)
UROBILINOGEN UR STRIP.AUTO-MCNC: <2 MG/DL
VLDL: 24 MG/DL (ref 0–40)
WBC # BLD AUTO: 7.8 X10*3/UL (ref 4.4–11.3)

## 2024-04-27 PROCEDURE — 81003 URINALYSIS AUTO W/O SCOPE: CPT

## 2024-04-27 PROCEDURE — 80053 COMPREHEN METABOLIC PANEL: CPT

## 2024-04-27 PROCEDURE — 80061 LIPID PANEL: CPT

## 2024-04-27 PROCEDURE — 82306 VITAMIN D 25 HYDROXY: CPT

## 2024-04-27 PROCEDURE — 84481 FREE ASSAY (FT-3): CPT

## 2024-04-27 PROCEDURE — 84443 ASSAY THYROID STIM HORMONE: CPT

## 2024-04-27 PROCEDURE — 85025 COMPLETE CBC W/AUTO DIFF WBC: CPT

## 2024-04-27 PROCEDURE — 36415 COLL VENOUS BLD VENIPUNCTURE: CPT

## 2024-07-16 ENCOUNTER — APPOINTMENT (OUTPATIENT)
Dept: RADIOLOGY | Facility: HOSPITAL | Age: 44
End: 2024-07-16

## 2024-07-16 ENCOUNTER — HOSPITAL ENCOUNTER (EMERGENCY)
Facility: HOSPITAL | Age: 44
Discharge: HOME | End: 2024-07-16

## 2024-07-16 VITALS
HEART RATE: 96 BPM | TEMPERATURE: 97.9 F | RESPIRATION RATE: 16 BRPM | WEIGHT: 285 LBS | OXYGEN SATURATION: 96 % | BODY MASS INDEX: 43.19 KG/M2 | SYSTOLIC BLOOD PRESSURE: 143 MMHG | DIASTOLIC BLOOD PRESSURE: 86 MMHG | HEIGHT: 68 IN

## 2024-07-16 DIAGNOSIS — M25.561 ACUTE PAIN OF RIGHT KNEE: Primary | ICD-10-CM

## 2024-07-16 PROCEDURE — 73562 X-RAY EXAM OF KNEE 3: CPT | Mod: RIGHT SIDE | Performed by: RADIOLOGY

## 2024-07-16 PROCEDURE — 73562 X-RAY EXAM OF KNEE 3: CPT | Mod: RT

## 2024-07-16 PROCEDURE — 99283 EMERGENCY DEPT VISIT LOW MDM: CPT

## 2024-07-16 RX ORDER — IBUPROFEN 600 MG/1
600 TABLET ORAL EVERY 6 HOURS PRN
Qty: 24 TABLET | Refills: 0 | Status: SHIPPED | OUTPATIENT
Start: 2024-07-16

## 2024-07-16 ASSESSMENT — PAIN - FUNCTIONAL ASSESSMENT: PAIN_FUNCTIONAL_ASSESSMENT: 0-10

## 2024-07-16 ASSESSMENT — COLUMBIA-SUICIDE SEVERITY RATING SCALE - C-SSRS
6. HAVE YOU EVER DONE ANYTHING, STARTED TO DO ANYTHING, OR PREPARED TO DO ANYTHING TO END YOUR LIFE?: NO
2. HAVE YOU ACTUALLY HAD ANY THOUGHTS OF KILLING YOURSELF?: NO
1. IN THE PAST MONTH, HAVE YOU WISHED YOU WERE DEAD OR WISHED YOU COULD GO TO SLEEP AND NOT WAKE UP?: NO

## 2024-07-16 ASSESSMENT — PAIN DESCRIPTION - ORIENTATION: ORIENTATION: RIGHT

## 2024-07-16 ASSESSMENT — PAIN DESCRIPTION - DESCRIPTORS: DESCRIPTORS: THROBBING

## 2024-07-16 ASSESSMENT — PAIN DESCRIPTION - PAIN TYPE: TYPE: ACUTE PAIN

## 2024-07-16 ASSESSMENT — PAIN DESCRIPTION - FREQUENCY: FREQUENCY: CONSTANT/CONTINUOUS

## 2024-07-16 ASSESSMENT — PAIN SCALES - GENERAL: PAINLEVEL_OUTOF10: 8

## 2024-07-16 ASSESSMENT — PAIN DESCRIPTION - LOCATION: LOCATION: KNEE

## 2024-07-17 NOTE — ED PROVIDER NOTES
HPI   Chief Complaint   Patient presents with    Knee Pain     I was walking and heard an audible snap and couldn't put any weight on it.       40-year-old female presents emergency department, patient states she was outside cleaning up, putting away small blowup pool when suddenly she heard a large snap right knee, immediately had severe pain medial aspect.  States she dropped to the ground, had to crawl inside and then call family members to help transport her to the emergency department.  Describes increased pain with range of motion or any weightbearing.  Denies any numbness or tingling to the distal extremity.  Denies any additional injuries.      History provided by:  Patient   used: No            Patient History   Past Medical History:   Diagnosis Date    Anxiety      Past Surgical History:   Procedure Laterality Date    HERNIA REPAIR      NEPHRECTOMY Right     right lower lobe     Family History   Problem Relation Name Age of Onset    Testicular cancer Father      Hypertension Maternal Grandmother      Alzheimer's disease Maternal Grandfather      Breast cancer Paternal Grandmother      Lung cancer Paternal Grandfather       Social History     Tobacco Use    Smoking status: Former     Current packs/day: 0.00     Types: Cigarettes     Quit date: 2022     Years since quittin.2    Smokeless tobacco: Never   Vaping Use    Vaping status: Never Used   Substance Use Topics    Alcohol use: Not Currently    Drug use: Not Currently       Physical Exam   ED Triage Vitals [24]   Temperature Heart Rate Respirations BP   36.6 °C (97.9 °F) 96 16 143/86      Pulse Ox Temp Source Heart Rate Source Patient Position   96 % Temporal Monitor Sitting      BP Location FiO2 (%)     Right arm --       Physical Exam  Gen.: Vitals noted. No distress. Afebrile.   Cardiac: Regular rate rhythm. No murmur.   Pulmonary: Equal breath sounds bilaterally. No adventitious breath sounds.   Abdomen: Soft,  nontender, nonsurgical. Normoactive bowel sounds.   Back: Nontender throughout.   Lower extremity: There is tenderness over the medial joint line. There is no tenderness over the lateral joint line. The extensor mechanism is intact. There is no obvious laxity. The remainder of the extremity, specifically, the tib-fib, ankle, and foot are nontender. Skin is intact. Is neurovascularly intact distally. There is no evidence of an intra-articular infection. Compartments are soft to palpation. There is no suggestion of DVT.    ED Course & MDM   Diagnoses as of 07/16/24 2127   Acute pain of right knee                       Michael Coma Scale Score: 15                    Labs Reviewed - No data to display     XR knee right 3 views   Final Result   Mild osteoarthritis in the right knee        MACRO:   None        Signed by: Sohail Harper 7/16/2024 9:21 PM   Dictation workstation:   [id number]            Medical Decision Making  X-ray imaging of the right knee obtained, shows evidence of osteoarthritis but otherwise unremarkable.    Discussed possibility of soft tissue injury such as MCL tear.  Placed in a knee immobilizer-by the bedside nurse, MSPs intact post assistive device placement.  And provided crutches.  Patient declines any pain medication here.    Discussed ice and elevation, continue with anti-inflammatories at home.    Discussed close follow-up with orthopedics, recommended return with any worsening symptoms or additional concerns.    Procedure  Procedures     MACARENA Vaughan-CNP  07/16/24 2131

## 2024-07-18 ENCOUNTER — OFFICE VISIT (OUTPATIENT)
Dept: ORTHOPEDIC SURGERY | Facility: CLINIC | Age: 44
End: 2024-07-18

## 2024-07-18 VITALS — WEIGHT: 285 LBS | BODY MASS INDEX: 43.19 KG/M2 | HEIGHT: 68 IN

## 2024-07-18 DIAGNOSIS — S83.90XA SPRAIN OF KNEE, INITIAL ENCOUNTER: ICD-10-CM

## 2024-07-18 DIAGNOSIS — M23.91 INTERNAL DERANGEMENT OF RIGHT KNEE: Primary | ICD-10-CM

## 2024-07-18 PROCEDURE — 99213 OFFICE O/P EST LOW 20 MIN: CPT | Performed by: INTERNAL MEDICINE

## 2024-07-18 PROCEDURE — 3008F BODY MASS INDEX DOCD: CPT | Performed by: INTERNAL MEDICINE

## 2024-07-18 PROCEDURE — 99203 OFFICE O/P NEW LOW 30 MIN: CPT | Performed by: INTERNAL MEDICINE

## 2024-07-18 PROCEDURE — L1812 KO ELASTIC W/JOINTS PRE OTS: HCPCS | Performed by: INTERNAL MEDICINE

## 2024-07-18 RX ORDER — METHYLPREDNISOLONE 4 MG/1
TABLET ORAL
Qty: 1 EACH | Refills: 0 | Status: SHIPPED | OUTPATIENT
Start: 2024-07-18

## 2024-07-18 ASSESSMENT — PATIENT HEALTH QUESTIONNAIRE - PHQ9
1. LITTLE INTEREST OR PLEASURE IN DOING THINGS: NOT AT ALL
2. FEELING DOWN, DEPRESSED OR HOPELESS: NOT AT ALL
SUM OF ALL RESPONSES TO PHQ9 QUESTIONS 1 AND 2: 0

## 2024-07-18 NOTE — PROGRESS NOTES
Acute Injury New Patient Visit    CC:   Chief Complaint   Patient presents with    Right Knee - Pain     Right knee pain, walking in her backyard and heard a pop, DOI:07/16/24, xrays at [facility]       HPI: Emelia is a 43 y.o. female presents today for evaluation for acute right knee injury sustained two days ago while walking in her backyard. She states that she felt a pop in her right knee. She went to the ER where x-rays were taken. She is a nurse. No previous surgeries to the right knee. She is here for initial evaluation.         Review of Systems   GENERAL: Negative for malaise, significant weight loss, fever  MUSCULOSKELETAL: See HPI  NEURO:  Negative for numbness / tingling     Past Medical History  Past Medical History:   Diagnosis Date    Anxiety        Medication review  Medication Documentation Review Audit       Reviewed by Minda Zeng RN (Registered Nurse) on 07/16/24 at 2122      Medication Order Taking? Sig Documenting Provider Last Dose Status   albuterol 108 (90 Base) MCG/ACT inhaler [id number]  Inhale 2 puffs 3 times a day. Historical Provider, MD  Active   cholecalciferol (Vitamin D3) 50 mcg (2,000 unit) capsule [id number]  Take 1 capsule (50 mcg) by mouth once daily. Historical Provider, MD  Active   mometasone (Nasonex) 50 mcg/actuation nasal spray [id number]  Administer 2 sprays into each nostril 2 times a day. Love Espinoza MD  Active   montelukast (Singulair) 10 mg tablet [id number]  Take 1 tablet (10 mg) by mouth once daily. Love Espinoza MD  Active   sertraline (Zoloft) 50 mg tablet [id number]  Take 1 tablet (50 mg) by mouth once daily. Archie Foss MD  Active   vit A/vit C/vit E/zinc/copper (PRESERVISION AREDS ORAL) [id number]  Take 1 tablet by mouth once daily. Historical Provider, MD  Active                    Allergies  Allergies   Allergen Reactions    Amoxicillin Unknown    Lidoderm [Lidocaine] Unknown    Percocet [Oxycodone-Acetaminophen] Unknown       Social History  Social  History     Socioeconomic History    Marital status: Single     Spouse name: Not on file    Number of children: Not on file    Years of education: Not on file    Highest education level: Not on file   Occupational History    Not on file   Tobacco Use    Smoking status: Former     Current packs/day: 0.00     Types: Cigarettes     Quit date: 2022     Years since quittin.2    Smokeless tobacco: Never   Vaping Use    Vaping status: Never Used   Substance and Sexual Activity    Alcohol use: Not Currently    Drug use: Not Currently    Sexual activity: Not on file   Other Topics Concern    Not on file   Social History Narrative    Not on file     Social Determinants of Health     Financial Resource Strain: Not on File (2022)    Received from [facility]    Financial Resource Strain     Financial Resource Strain: 0   Food Insecurity: Not on File (2022)    Received from [facility]    Food Insecurity     Food: 0   Transportation Needs: Not on File (2022)    Received from [facility]    Transportation Needs     Transportation: 0   Physical Activity: Not on File (2022)    Received from [facility]    Physical Activity     Physical Activity: 0   Stress: Not on File (2022)    Received from [facility]    Stress     Stress: 0   Social Connections: Not on File (2022)    Received from [facility]    Social Connections     Social Connections and Isolation: 0   Intimate Partner Violence: Not on file   Housing Stability: Not on File (2022)    Received from [facility]    Housing Stability     Housin       Surgical History  Past Surgical History:   Procedure Laterality Date    HERNIA REPAIR      NEPHRECTOMY Right     right lower lobe       Physical Exam:  GENERAL:  Patient is awake, alert, and oriented to person place and time.  Patient appears well nourished and well kept.  Affect Calm, Not Acutely Distressed.  HEENT:  Normocephalic, Atraumatic, EOMI  CARDIOVASCULAR:   Hemodynamically stable.  RESPIRATORY:  Normal respirations with unlabored breathing.  Extremity: Right knee examination shows skin is intact.  There is no erythema or warmth.  2+ effusion.  Can flex the right knee to 90 degrees with pain.  Full extension at 0 degrees.  Pain over the medial joint line.  Pain over the lateral joint line.  There is no pain over the patellar or quadricep tendon.  No pain over the proximal tibia.  No pain over the popliteal fossa.  Positive valgus stress test with instability.  Negative varus stress test.  Positive Cullen's test medially with instability.  Positive Cullen's test laterally with instability.  Unable to perform a satisfactory Lachman's test due to pain and guarding.  Patellar and quadricep mechanism intact.  Unable to perform a satisfactory anterior and posterior drawer test due to pain and guarding.  Negative patellar apprehension test.  Distal pulses are palpable, neurovascularly intact.  Walking with  significant antalgic gait.  Left knee was examined for comparison.      Diagnostics: X-rays reviewed  XR knee right 3 views  Narrative: Interpreted By:  Sohail Harper,   STUDY:  XR KNEE RIGHT 3 VIEWS; ;  7/16/2024 9:14 pm      INDICATION:  Signs/Symptoms:Right knee injury, felt a snap medial knee.      COMPARISON:  None.      ACCESSION NUMBER(S):  [id number]      ORDERING CLINICIAN:  ANANYA HURST      FINDINGS:  Right knee, three views      There is no fracture. There is no dislocation. Mild degenerative  changes with tricompartmental osteophytosis. There is no effusion.  There is no soft tissue abnormality seen.      Impression: Mild osteoarthritis in the right knee      MACRO:  None      Signed by: Sohail Harper 7/16/2024 9:21 PM  Dictation workstation:   [id number]      Procedure: None    Assessment:   Right knee sprain  Right knee internal derangement    Plan: Emelia presents today for initial evaluation for acute right knee injury sustained two days ago.  X-rays showed no obvious fractures.  She is having persistent pain, swelling with instability and loss of range of motion, we recommended a hinge knee brace for support, crutches as needed, Medrol dose Jay Jay for the acute inflammation, physical therapy and MRI of the right knee for preoperative planning. She will follow-up with Dr Bebeto Benson after the MRI.     No orders of the defined types were placed in this encounter.     At the conclusion of the visit there were no further questions by the patient/family regarding their plan of care.  Patient was instructed to call or return with any issues, questions, or concerns regarding their injury and/or treatment plan described above.     07/18/24 at 10:00 AM - Mabel Sahni MD  Scribe Attestation  By signing my name below, I, Gus Christal, Scribe   attest that this documentation has been prepared under the direction and in the presence of Mabel Sahni MD.    Office: [phone]    This note was prepared using voice recognition software.  The details of this note are correct and have been reviewed, and corrected to the best of my ability.  Some grammatical errors may persist related to the Dragon software.

## 2024-08-09 ENCOUNTER — APPOINTMENT (OUTPATIENT)
Dept: RADIOLOGY | Facility: HOSPITAL | Age: 44
End: 2024-08-09
Payer: MEDICAID

## 2024-09-19 ENCOUNTER — APPOINTMENT (OUTPATIENT)
Dept: PRIMARY CARE | Facility: CLINIC | Age: 44
End: 2024-09-19
Payer: MEDICAID

## 2024-09-19 VITALS
BODY MASS INDEX: 45.92 KG/M2 | OXYGEN SATURATION: 98 % | HEART RATE: 78 BPM | TEMPERATURE: 98.1 F | WEIGHT: 293 LBS | DIASTOLIC BLOOD PRESSURE: 80 MMHG | SYSTOLIC BLOOD PRESSURE: 128 MMHG

## 2024-09-19 DIAGNOSIS — E66.01 MORBID OBESITY WITH BMI OF 45.0-49.9, ADULT (MULTI): ICD-10-CM

## 2024-09-19 DIAGNOSIS — M25.561 ACUTE PAIN OF RIGHT KNEE: ICD-10-CM

## 2024-09-19 DIAGNOSIS — F33.1 MODERATE EPISODE OF RECURRENT MAJOR DEPRESSIVE DISORDER: Primary | ICD-10-CM

## 2024-09-19 PROCEDURE — 99214 OFFICE O/P EST MOD 30 MIN: CPT | Performed by: INTERNAL MEDICINE

## 2024-09-19 RX ORDER — PHENTERMINE HYDROCHLORIDE 37.5 MG/1
37.5 TABLET ORAL
Qty: 30 TABLET | Refills: 0 | Status: SHIPPED | OUTPATIENT
Start: 2024-09-19 | End: 2024-10-19

## 2024-09-19 ASSESSMENT — ENCOUNTER SYMPTOMS: DEPRESSION: 0

## 2024-09-19 ASSESSMENT — PATIENT HEALTH QUESTIONNAIRE - PHQ9
2. FEELING DOWN, DEPRESSED OR HOPELESS: NOT AT ALL
1. LITTLE INTEREST OR PLEASURE IN DOING THINGS: NOT AT ALL
SUM OF ALL RESPONSES TO PHQ9 QUESTIONS 1 AND 2: 0

## 2024-09-19 NOTE — PROGRESS NOTES
Emelia Aponte, david 43 y.o. female was seen today:   Chief Complaint   Patient presents with    Weight Loss     For adipex prescription     Referral     For PT for her right knee due to a torn meniscus and sprain MCL       VISIT SUMMERY:  Emelia feels happy these days.  She got a full-time job.  She has grandkids that she enjoys with.  She used to be on Zoloft.  Patient thinks that she now can come off of antidepressant.  She was given a weaning protocol to taper off Zoloft.  She does have right to pain.  She has seen orthopedics.  She is waiting for MRI.  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  She is waiting for the clearance and then will go for MRI.  She might need arthroscopic exam of right knee.  Due to his right knee instability she is unable to walk or exercise well.  She has been gaining weight.  She wants to lose weight.  She has done research on Adipex.  Patient is requesting weight management pill.  We discussed about pros and cons of Adipex.      TODAY'S VISIT  DX:     1. Moderate episode of recurrent major depressive disorder (Multi)  Patient wants to come off of Zoloft.  She is feeling better.  Weaning protocol has been given.      2. Acute pain of right knee  Referral to Physical Therapy while awaiting for MRI      3. Morbid obesity with BMI of 45.0-49.9, adult (Multi)  phentermine (Adipex-P) 37.5 mg tablet         MEDICAL DECISION MAKING:  - Treatment and therapy plan are as above   - Active medical co morbidities have been considered.   - Recent lab work and relevant imaging studies were reviewed.    - Correspondence/notes from specialty consultants were checked.    - Next Follow up in 30 days to get second Adipex prescription if she can lose weight at least 10 pounds.      Review of Systems   Constitutional:  Negative for activity change and fever.   HENT:  Negative for hearing loss, nosebleeds and tinnitus.    Eyes:  Negative for redness.   Respiratory:   Negative for chest tightness and stridor.    Cardiovascular:  Negative for chest pain, palpitations and leg swelling.   Gastrointestinal:  Negative for blood in stool.   Endocrine: Negative for cold intolerance.   Genitourinary:  Negative for hematuria.   Musculoskeletal:  Negative for joint swelling.   Skin:  Negative for rash.   Neurological:  Negative for speech difficulty and light-headedness.   Psychiatric/Behavioral:  Negative for behavioral problems.      Visit Vitals  /80 (BP Location: Left arm, Patient Position: Sitting, BP Cuff Size: Large adult)   Pulse 78   Temp 36.7 °C (98.1 °F) (Temporal)   Wt 137 kg (302 lb)   SpO2 98%   BMI 45.92 kg/m²   Smoking Status Former   BSA 2.56 m²         Wt Readings from Last 10 Encounters:   09/19/24 137 kg (302 lb)   07/18/24 129 kg (285 lb)   07/16/24 129 kg (285 lb)   04/23/24 134 kg (296 lb)   02/05/24 133 kg (293 lb)   11/13/23 132 kg (290 lb)   11/01/23 136 kg (299 lb)   09/28/23 135 kg (298 lb 3.2 oz)   04/27/23 133 kg (293 lb)   04/20/23 132 kg (292 lb)     Physical Exam  Constitutional:       General: She is not in acute distress.     Appearance: Normal appearance.   HENT:      Head: Normocephalic.      Right Ear: Tympanic membrane normal.      Left Ear: Tympanic membrane normal.      Mouth/Throat:      Mouth: Mucous membranes are moist.   Cardiovascular:      Rate and Rhythm: Normal rate and regular rhythm.      Heart sounds: No murmur heard.  Pulmonary:      Effort: No respiratory distress.   Abdominal:      Palpations: Abdomen is soft.   Musculoskeletal:      Cervical back: Neck supple.      Right lower leg: No edema.      Left lower leg: No edema.   Skin:     Findings: No rash.   Neurological:      General: No focal deficit present.      Mental Status: She is alert and oriented to person, place, and time.   Psychiatric:         Mood and Affect: Mood normal.          MEDICATIONS:   Current Outpatient Medications   Medication Instructions    albuterol 108  (90 Base) MCG/ACT inhaler 2 puffs, inhalation, 3 times daily RT    cholecalciferol (VITAMIN D3) 2,000 Units, oral, Daily    ibuprofen 600 mg, oral, Every 6 hours PRN    mometasone (Nasonex) 50 mcg/actuation nasal spray 2 sprays, Each Nostril, 2 times daily    montelukast (SINGULAIR) 10 mg, oral, Daily    sertraline (ZOLOFT) 50 mg, oral, Daily    vit A/vit C/vit E/zinc/copper (PRESERVISION AREDS ORAL) 1 tablet, oral, Daily       RECENT LABS:  Lab Results   Component Value Date    WBC 7.8 04/27/2024    HGB 12.0 04/27/2024    HCT 38.7 04/27/2024     04/27/2024    CHOL 156 04/27/2024    TRIG 122 04/27/2024    HDL 42.2 04/27/2024    ALT 11 04/27/2024    AST 12 04/27/2024     04/27/2024    K 4.2 04/27/2024     04/27/2024    CREATININE 0.80 04/27/2024    BUN 21 04/27/2024    CO2 25 04/27/2024    TSH 3.30 04/27/2024     Lab Results   Component Value Date    GLUCOSE 92 04/27/2024    CALCIUM 8.7 04/27/2024     04/27/2024    K 4.2 04/27/2024    CO2 25 04/27/2024     04/27/2024    BUN 21 04/27/2024    CREATININE 0.80 04/27/2024      Lab Results   Component Value Date    LDLCALC 89 04/27/2024       Lab Results   Component Value Date    LDLCALC 89 04/27/2024    CREATININE 0.80 04/27/2024             P.S: This note was completed using Dragon voice recognition technology and may include unintended errors with respect to translation of words, typographical errors or grammar errors which may not have been identified while finalizing the chart.

## 2024-09-20 ASSESSMENT — ENCOUNTER SYMPTOMS
JOINT SWELLING: 0
HEMATURIA: 0
EYE REDNESS: 0
ACTIVITY CHANGE: 0
STRIDOR: 0
SPEECH DIFFICULTY: 0
BLOOD IN STOOL: 0
PALPITATIONS: 0
LIGHT-HEADEDNESS: 0
FEVER: 0
CHEST TIGHTNESS: 0

## 2024-10-17 ENCOUNTER — APPOINTMENT (OUTPATIENT)
Dept: PRIMARY CARE | Facility: CLINIC | Age: 44
End: 2024-10-17
Payer: COMMERCIAL

## 2024-10-17 VITALS
WEIGHT: 293 LBS | SYSTOLIC BLOOD PRESSURE: 118 MMHG | TEMPERATURE: 98 F | DIASTOLIC BLOOD PRESSURE: 74 MMHG | OXYGEN SATURATION: 98 % | HEIGHT: 68 IN | HEART RATE: 86 BPM | BODY MASS INDEX: 44.41 KG/M2

## 2024-10-17 DIAGNOSIS — Z76.89 ENCOUNTER FOR WEIGHT MANAGEMENT: Primary | ICD-10-CM

## 2024-10-17 DIAGNOSIS — E66.01 MORBID OBESITY WITH BMI OF 45.0-49.9, ADULT (MULTI): ICD-10-CM

## 2024-10-17 PROCEDURE — 99214 OFFICE O/P EST MOD 30 MIN: CPT | Performed by: INTERNAL MEDICINE

## 2024-10-17 PROCEDURE — 3008F BODY MASS INDEX DOCD: CPT | Performed by: INTERNAL MEDICINE

## 2024-10-17 RX ORDER — PHENTERMINE HYDROCHLORIDE 37.5 MG/1
37.5 TABLET ORAL
Qty: 30 TABLET | Refills: 0 | Status: SHIPPED | OUTPATIENT
Start: 2024-10-17 | End: 2024-11-16

## 2024-10-17 NOTE — PROGRESS NOTES
Emelia Aponte, pleasant 43 y.o. female was seen today:   Chief Complaint   Patient presents with    28-Day appointment     VISIT FRANK:    Emelia Aponte Comes here for weight management.  Her BMI is 45.  She started off with weight of 302 pound.  She has taken 1 course of Adipex and has lost about 6 pound.  She wants to continue it.  We also discussed about increase activity and decrease calorie consumption.  She is highly motivated.  She is watching her diet.  She is trying to increase her physical activity.  Patient has contract with me for a controlled medicine, Adipex.  The OARRS report has been checked today. There is no aberrancy. Patient wants to stay on the same medicine as it is helpful for day to day life. As of yet, patient did not experienced any obvious adverse effect. However the potential side effects have been discussed again during this visit.         MEDICATIONS:   Current Outpatient Medications   Medication Instructions    albuterol 108 (90 Base) MCG/ACT inhaler 2 puffs, 3 times daily RT    cholecalciferol (VITAMIN D3) 2,000 Units, Daily    mometasone (Nasonex) 50 mcg/actuation nasal spray 2 sprays, Each Nostril, 2 times daily    montelukast (SINGULAIR) 10 mg, oral, Daily    phentermine (ADIPEX-P) 37.5 mg, oral, Daily before breakfast    vit A/vit C/vit E/zinc/copper (PRESERVISION AREDS ORAL) 1 tablet, Daily       TODAY'S VISIT  DX:     1. Encounter for weight management  Increase activity and decrease calorie consumption      2. Morbid obesity with BMI of 45.0-49.9, adult (Multi)  phentermine (Adipex-P) 37.5 mg tablet           MEDICAL DECISION MAKING:  - Treatment and therapy plan are as above   - Active medical co morbidities have been considered.   - Recent lab work and relevant imaging studies were reviewed.    - Correspondence/notes from specialty consultants were checked.    - Next Follow up in 30 days      Review of Systems   Constitutional:  Negative for activity change and fever.  "  HENT:  Negative for hearing loss, nosebleeds and tinnitus.    Eyes:  Negative for redness.   Respiratory:  Negative for chest tightness and stridor.    Cardiovascular:  Negative for chest pain, palpitations and leg swelling.   Gastrointestinal:  Negative for blood in stool.   Endocrine: Negative for cold intolerance.   Genitourinary:  Negative for hematuria.   Musculoskeletal:  Negative for joint swelling.   Skin:  Negative for rash.   Neurological:  Negative for speech difficulty and light-headedness.   Psychiatric/Behavioral:  Negative for behavioral problems.      Visit Vitals  /74 (BP Location: Left arm, Patient Position: Sitting)   Pulse 86   Temp 36.7 °C (98 °F)   Ht 1.727 m (5' 8\")   Wt 134 kg (296 lb)   SpO2 98% Comment: RA   BMI 45.01 kg/m²   Smoking Status Former   BSA 2.54 m²         Wt Readings from Last 10 Encounters:   10/17/24 134 kg (296 lb)   09/19/24 137 kg (302 lb)   07/18/24 129 kg (285 lb)   07/16/24 129 kg (285 lb)   04/23/24 134 kg (296 lb)   02/05/24 133 kg (293 lb)   11/13/23 132 kg (290 lb)   11/01/23 136 kg (299 lb)   09/28/23 135 kg (298 lb 3.2 oz)   04/27/23 133 kg (293 lb)     Physical Exam  Constitutional:       General: She is not in acute distress.     Appearance: Normal appearance.   HENT:      Head: Normocephalic.      Right Ear: Tympanic membrane normal.      Left Ear: Tympanic membrane normal.      Mouth/Throat:      Mouth: Mucous membranes are moist.   Cardiovascular:      Rate and Rhythm: Normal rate and regular rhythm.      Heart sounds: No murmur heard.  Pulmonary:      Effort: No respiratory distress.   Abdominal:      Palpations: Abdomen is soft.   Musculoskeletal:      Cervical back: Neck supple.      Right lower leg: No edema.      Left lower leg: No edema.   Skin:     Findings: No rash.   Neurological:      General: No focal deficit present.      Mental Status: She is alert and oriented to person, place, and time.   Psychiatric:         Mood and Affect: Mood " normal.        RECENT LABS:  Lab Results   Component Value Date    WBC 7.8 04/27/2024    HGB 12.0 04/27/2024    HCT 38.7 04/27/2024     04/27/2024    CHOL 156 04/27/2024    TRIG 122 04/27/2024    HDL 42.2 04/27/2024    ALT 11 04/27/2024    AST 12 04/27/2024     04/27/2024    K 4.2 04/27/2024     04/27/2024    CREATININE 0.80 04/27/2024    BUN 21 04/27/2024    CO2 25 04/27/2024    TSH 3.30 04/27/2024     Lab Results   Component Value Date    GLUCOSE 92 04/27/2024    CALCIUM 8.7 04/27/2024     04/27/2024    K 4.2 04/27/2024    CO2 25 04/27/2024     04/27/2024    BUN 21 04/27/2024    CREATININE 0.80 04/27/2024      Lab Results   Component Value Date    LDLCALC 89 04/27/2024    CREATININE 0.80 04/27/2024             P.S: This note was completed using Dragon voice recognition technology and may include unintended errors with respect to translation of words, typographical errors or grammar errors which may not have been identified while finalizing the chart.

## 2024-10-25 ASSESSMENT — ENCOUNTER SYMPTOMS
ACTIVITY CHANGE: 0
HEMATURIA: 0
CHEST TIGHTNESS: 0
FEVER: 0
BLOOD IN STOOL: 0
STRIDOR: 0
EYE REDNESS: 0
SPEECH DIFFICULTY: 0
JOINT SWELLING: 0
LIGHT-HEADEDNESS: 0
PALPITATIONS: 0

## 2024-11-14 ENCOUNTER — APPOINTMENT (OUTPATIENT)
Dept: PRIMARY CARE | Facility: CLINIC | Age: 44
End: 2024-11-14
Payer: COMMERCIAL

## 2024-11-14 VITALS
TEMPERATURE: 98.2 F | DIASTOLIC BLOOD PRESSURE: 76 MMHG | SYSTOLIC BLOOD PRESSURE: 124 MMHG | WEIGHT: 293 LBS | BODY MASS INDEX: 44.41 KG/M2 | HEART RATE: 98 BPM | HEIGHT: 68 IN | OXYGEN SATURATION: 97 %

## 2024-11-14 DIAGNOSIS — E66.01 CLASS 3 SEVERE OBESITY DUE TO EXCESS CALORIES WITH SERIOUS COMORBIDITY AND BODY MASS INDEX (BMI) OF 40.0 TO 44.9 IN ADULT: Primary | ICD-10-CM

## 2024-11-14 DIAGNOSIS — E66.813 CLASS 3 SEVERE OBESITY DUE TO EXCESS CALORIES WITH SERIOUS COMORBIDITY AND BODY MASS INDEX (BMI) OF 40.0 TO 44.9 IN ADULT: Primary | ICD-10-CM

## 2024-11-14 PROCEDURE — 1036F TOBACCO NON-USER: CPT | Performed by: INTERNAL MEDICINE

## 2024-11-14 PROCEDURE — 99214 OFFICE O/P EST MOD 30 MIN: CPT | Performed by: INTERNAL MEDICINE

## 2024-11-14 PROCEDURE — 3008F BODY MASS INDEX DOCD: CPT | Performed by: INTERNAL MEDICINE

## 2024-11-14 RX ORDER — PHENTERMINE HYDROCHLORIDE 37.5 MG/1
37.5 TABLET ORAL
Qty: 30 TABLET | Refills: 0 | Status: SHIPPED | OUTPATIENT
Start: 2024-11-14 | End: 2024-12-14

## 2024-11-14 ASSESSMENT — ENCOUNTER SYMPTOMS
BLOOD IN STOOL: 0
STRIDOR: 0
FEVER: 0
CHEST TIGHTNESS: 0
JOINT SWELLING: 0
EYE REDNESS: 0
SPEECH DIFFICULTY: 0
PALPITATIONS: 0
ACTIVITY CHANGE: 0
LIGHT-HEADEDNESS: 0
HEMATURIA: 0

## 2024-11-14 ASSESSMENT — PATIENT HEALTH QUESTIONNAIRE - PHQ9
SUM OF ALL RESPONSES TO PHQ9 QUESTIONS 1 AND 2: 0
1. LITTLE INTEREST OR PLEASURE IN DOING THINGS: NOT AT ALL
2. FEELING DOWN, DEPRESSED OR HOPELESS: NOT AT ALL

## 2024-11-14 NOTE — PROGRESS NOTES
Emelia Aponte, pleasant 43 y.o. female was seen today:     Chief Complaint   Patient presents with    Weight Check       VISIT SUMMERY:    Emelia Aponte   Patient comes here for one month follow-up.  She has been on Adipex.  This would be her third prescription.  She is trying to lose weight.  Although patient does not see the rapid change of her weight however she feels much lighter.  She is getting better sitting with her clothings.  She takes her weight at home which shows that she lost weight.  Patient wants to try 1 more month on Adipex.  She is also taking her dog for walking.  She has changed a lot in her calorie consumption.  She is hoping that this month she would do a very good progress symptoms of losing weight.  She does not report any side effect on Adipex.    Patient has contract with me for a controlled medicine, Adipex.  The OARRS report has been checked today. There is no aberrancy. Patient wants to stay on the same medicine as it is helpful for day to day life. As of yet, patient did not experienced any obvious adverse effect. However the potential side effects have been discussed again during this visit.         MEDICATIONS:   Current Outpatient Medications   Medication Instructions    albuterol 108 (90 Base) MCG/ACT inhaler 2 puffs, 3 times daily RT    cholecalciferol (VITAMIN D3) 2,000 Units, Daily    mometasone (Nasonex) 50 mcg/actuation nasal spray 2 sprays, Each Nostril, 2 times daily    montelukast (SINGULAIR) 10 mg, oral, Daily    phentermine (ADIPEX-P) 37.5 mg, oral, Daily before breakfast    vit A/vit C/vit E/zinc/copper (PRESERVISION AREDS ORAL) 1 tablet, Daily       TODAY'S VISIT  DX:     1. Class 3 severe obesity due to excess calories with serious comorbidity and body mass index (BMI) of 40.0 to 44.9 in adult  phentermine (Adipex-P) 37.5 mg tablet           MEDICAL DECISION MAKING:  - Treatment and therapy plan are as above   - Active medical co morbidities have been considered.   -  "Recent lab work and relevant imaging studies were reviewed.    - Correspondence/notes from specialty consultants were checked.    - Next Follow up in January 2025      Review of Systems   Constitutional:  Negative for activity change and fever.   HENT:  Negative for hearing loss, nosebleeds and tinnitus.    Eyes:  Negative for redness.   Respiratory:  Negative for chest tightness and stridor.    Cardiovascular:  Negative for chest pain, palpitations and leg swelling.   Gastrointestinal:  Negative for blood in stool.   Endocrine: Negative for cold intolerance.   Genitourinary:  Negative for hematuria.   Musculoskeletal:  Negative for joint swelling.   Skin:  Negative for rash.   Neurological:  Negative for speech difficulty and light-headedness.   Psychiatric/Behavioral:  Negative for behavioral problems.      Visit Vitals  /76 (BP Location: Left arm, Patient Position: Sitting, BP Cuff Size: Large adult)   Pulse 98   Temp 36.8 °C (98.2 °F) (Temporal)   Ht 1.727 m (5' 8\")   Wt 134 kg (295 lb 6.4 oz)   SpO2 97% Comment: RA   BMI 44.92 kg/m²   Smoking Status Former   BSA 2.54 m²         Wt Readings from Last 10 Encounters:   11/14/24 134 kg (295 lb 6.4 oz)   10/17/24 134 kg (296 lb)   09/19/24 137 kg (302 lb)   07/18/24 129 kg (285 lb)   07/16/24 129 kg (285 lb)   04/23/24 134 kg (296 lb)   02/05/24 133 kg (293 lb)   11/13/23 132 kg (290 lb)   11/01/23 136 kg (299 lb)   09/28/23 135 kg (298 lb 3.2 oz)     Physical Exam  Constitutional:       General: She is not in acute distress.     Appearance: Normal appearance.   HENT:      Head: Normocephalic.      Right Ear: Tympanic membrane normal.      Left Ear: Tympanic membrane normal.      Mouth/Throat:      Mouth: Mucous membranes are moist.   Cardiovascular:      Rate and Rhythm: Normal rate and regular rhythm.      Heart sounds: No murmur heard.  Pulmonary:      Effort: No respiratory distress.   Abdominal:      Palpations: Abdomen is soft.   Musculoskeletal:      " Cervical back: Neck supple.      Right lower leg: No edema.      Left lower leg: No edema.   Skin:     Findings: No rash.   Neurological:      General: No focal deficit present.      Mental Status: She is alert and oriented to person, place, and time.   Psychiatric:         Mood and Affect: Mood normal.        RECENT LABS:  Lab Results   Component Value Date    WBC 7.8 04/27/2024    HGB 12.0 04/27/2024    HCT 38.7 04/27/2024     04/27/2024    CHOL 156 04/27/2024    TRIG 122 04/27/2024    HDL 42.2 04/27/2024    ALT 11 04/27/2024    AST 12 04/27/2024     04/27/2024    K 4.2 04/27/2024     04/27/2024    CREATININE 0.80 04/27/2024    BUN 21 04/27/2024    CO2 25 04/27/2024    TSH 3.30 04/27/2024     Lab Results   Component Value Date    GLUCOSE 92 04/27/2024    CALCIUM 8.7 04/27/2024     04/27/2024    K 4.2 04/27/2024    CO2 25 04/27/2024     04/27/2024    BUN 21 04/27/2024    CREATININE 0.80 04/27/2024      Lab Results   Component Value Date    LDLCALC 89 04/27/2024    CREATININE 0.80 04/27/2024             P.S: This note was completed using Dragon voice recognition technology and may include unintended errors with respect to translation of words, typographical errors or grammar errors which may not have been identified while finalizing the chart.

## 2024-12-24 NOTE — PROGRESS NOTES
"Subjective   Patient ID: Emelia Aponte is a 44 y.o. female who presents for New Patient Visit.  HPI    New patient visit    History of several issues    Menopause stable  Might benefit from DEXA scan    Seasonal allergies stable  Better this time a year    High cholesterol stable  Watch diet follow blood work    Low vitamin D recommend replace    Obese recommend weight loss and diet    Recommend update general blood work as well    Review of Systems   Constitutional:  Negative for activity change and fatigue.   HENT:  Negative for congestion and sore throat.    Eyes:  Negative for discharge.   Respiratory:  Negative for cough, chest tightness and shortness of breath.    Cardiovascular:  Negative for chest pain and leg swelling.   Gastrointestinal:  Negative for abdominal pain, blood in stool, constipation, diarrhea, nausea and vomiting.   Endocrine: Negative for cold intolerance and heat intolerance.   Genitourinary:  Negative for difficulty urinating and hematuria.   Musculoskeletal:  Negative for arthralgias, back pain, gait problem, myalgias and neck pain.   Allergic/Immunologic: Negative for environmental allergies.   Neurological:  Negative for dizziness, syncope, weakness, numbness and headaches.   Hematological:  Negative for adenopathy. Does not bruise/bleed easily.   Psychiatric/Behavioral:  Negative for dysphoric mood. The patient is not nervous/anxious.    All other systems reviewed and are negative.      Objective   /83 (BP Location: Right arm, BP Cuff Size: Large adult)   Pulse 81   Ht 1.727 m (5' 8\")   Wt 131 kg (288 lb)   SpO2 97%   BMI 43.79 kg/m²    Physical Exam  Vitals and nursing note reviewed.   Constitutional:       General: She is not in acute distress.     Appearance: Normal appearance.   HENT:      Head: Normocephalic and atraumatic.      Right Ear: Tympanic membrane, ear canal and external ear normal.      Left Ear: Tympanic membrane, ear canal and external ear normal.      " Nose: Nose normal.      Mouth/Throat:      Mouth: Mucous membranes are moist.      Pharynx: Oropharynx is clear. No oropharyngeal exudate or posterior oropharyngeal erythema.   Eyes:      Extraocular Movements: Extraocular movements intact.      Conjunctiva/sclera: Conjunctivae normal.      Pupils: Pupils are equal, round, and reactive to light.   Cardiovascular:      Rate and Rhythm: Normal rate and regular rhythm.      Pulses: Normal pulses.      Heart sounds: Normal heart sounds. No murmur heard.  Pulmonary:      Effort: Pulmonary effort is normal. No respiratory distress.      Breath sounds: Normal breath sounds. No wheezing or rales.   Abdominal:      General: Abdomen is flat. Bowel sounds are normal. There is no distension.      Palpations: Abdomen is soft. There is no mass.      Tenderness: There is no abdominal tenderness.   Musculoskeletal:         General: No swelling or deformity. Normal range of motion.      Cervical back: Normal range of motion and neck supple.      Right lower leg: No edema.      Left lower leg: No edema.   Lymphadenopathy:      Cervical: No cervical adenopathy.   Skin:     General: Skin is warm and dry.      Capillary Refill: Capillary refill takes less than 2 seconds.      Findings: No lesion or rash.   Neurological:      General: No focal deficit present.      Mental Status: She is alert and oriented to person, place, and time.      Cranial Nerves: No cranial nerve deficit.      Motor: No weakness.   Psychiatric:         Mood and Affect: Mood normal.         Behavior: Behavior normal.         Thought Content: Thought content normal.         Judgment: Judgment normal.         Assessment/Plan   Problem List Items Addressed This Visit       Allergic rhinitis    Relevant Orders    Follow Up In [facility] - PCP    Menopause - Primary    Relevant Orders    TSH with reflex to Free T4 if abnormal (Completed)    Follicle Stimulating Hormone (Completed)    Luteinizing Hormone  (Completed)    Progesterone (Completed)     Other Visit Diagnoses       Mixed dyslipidemia        Vitamin D insufficiency        Class 3 severe obesity due to excess calories with serious comorbidity and body mass index (BMI) of 40.0 to 44.9 in adult                Patient education provided.  Stay current with age appropriate health maintenance as instructed.  Appointment here or ER with new or worsening symptoms'  Keep appropriate follow-up visit.  Stay current with proper immunizations   Blood work as above  Weight loss and diet  6-month recheck

## 2024-12-27 DIAGNOSIS — J10.1 INFLUENZA A H1N1 INFECTION: ICD-10-CM

## 2024-12-27 RX ORDER — BENZONATATE 200 MG/1
200 CAPSULE ORAL 3 TIMES DAILY PRN
Qty: 42 CAPSULE | Refills: 0 | Status: SHIPPED | OUTPATIENT
Start: 2024-12-27 | End: 2025-01-26

## 2024-12-27 RX ORDER — OSELTAMIVIR PHOSPHATE 75 MG/1
75 CAPSULE ORAL 2 TIMES DAILY
Qty: 10 CAPSULE | Refills: 0 | Status: SHIPPED | OUTPATIENT
Start: 2024-12-27 | End: 2025-01-01

## 2024-12-27 NOTE — TELEPHONE ENCOUNTER
Patient has tested positive for Flu A -- she is to see you as a NPV on 12/30/2024 -- she is wanting to know if you are able to help her with something for her cough.    Please advise if willing to       DDM cnc

## 2024-12-30 ENCOUNTER — APPOINTMENT (OUTPATIENT)
Dept: PRIMARY CARE | Facility: CLINIC | Age: 44
End: 2024-12-30
Payer: COMMERCIAL

## 2024-12-30 VITALS
HEIGHT: 68 IN | DIASTOLIC BLOOD PRESSURE: 83 MMHG | BODY MASS INDEX: 43.65 KG/M2 | WEIGHT: 288 LBS | OXYGEN SATURATION: 97 % | HEART RATE: 81 BPM | SYSTOLIC BLOOD PRESSURE: 127 MMHG

## 2024-12-30 DIAGNOSIS — J30.1 ALLERGIC RHINITIS DUE TO POLLEN, UNSPECIFIED SEASONALITY: ICD-10-CM

## 2024-12-30 DIAGNOSIS — E66.01 CLASS 3 SEVERE OBESITY DUE TO EXCESS CALORIES WITH SERIOUS COMORBIDITY AND BODY MASS INDEX (BMI) OF 40.0 TO 44.9 IN ADULT: ICD-10-CM

## 2024-12-30 DIAGNOSIS — E78.2 MIXED DYSLIPIDEMIA: ICD-10-CM

## 2024-12-30 DIAGNOSIS — E55.9 VITAMIN D INSUFFICIENCY: ICD-10-CM

## 2024-12-30 DIAGNOSIS — E66.813 CLASS 3 SEVERE OBESITY DUE TO EXCESS CALORIES WITH SERIOUS COMORBIDITY AND BODY MASS INDEX (BMI) OF 40.0 TO 44.9 IN ADULT: ICD-10-CM

## 2024-12-30 DIAGNOSIS — Z78.0 MENOPAUSE: Primary | ICD-10-CM

## 2024-12-30 PROCEDURE — 3008F BODY MASS INDEX DOCD: CPT | Performed by: FAMILY MEDICINE

## 2024-12-30 PROCEDURE — 99213 OFFICE O/P EST LOW 20 MIN: CPT | Performed by: FAMILY MEDICINE

## 2024-12-30 ASSESSMENT — ENCOUNTER SYMPTOMS
ABDOMINAL PAIN: 0
FATIGUE: 0
BRUISES/BLEEDS EASILY: 0
HEADACHES: 0
COUGH: 0
EYE DISCHARGE: 0
CONSTIPATION: 0
MYALGIAS: 0
CHEST TIGHTNESS: 0
DIZZINESS: 0
ACTIVITY CHANGE: 0
ARTHRALGIAS: 0
BLOOD IN STOOL: 0
ADENOPATHY: 0
DYSPHORIC MOOD: 0
DIARRHEA: 0
DIFFICULTY URINATING: 0
HEMATURIA: 0
BACK PAIN: 0
NERVOUS/ANXIOUS: 0
SHORTNESS OF BREATH: 0
NECK PAIN: 0
WEAKNESS: 0
NAUSEA: 0
NUMBNESS: 0
SORE THROAT: 0
VOMITING: 0

## 2024-12-31 ENCOUNTER — LAB (OUTPATIENT)
Dept: LAB | Facility: LAB | Age: 44
End: 2024-12-31
Payer: COMMERCIAL

## 2024-12-31 ENCOUNTER — APPOINTMENT (OUTPATIENT)
Dept: LAB | Facility: LAB | Age: 44
End: 2024-12-31
Payer: COMMERCIAL

## 2024-12-31 DIAGNOSIS — Z78.0 MENOPAUSE: ICD-10-CM

## 2024-12-31 LAB
FSH SERPL-ACNC: 6.6 IU/L
LH SERPL-ACNC: 3.3 IU/L
PROGEST SERPL-MCNC: 0.4 NG/ML
TSH SERPL-ACNC: 3.08 MIU/L (ref 0.44–3.98)

## 2024-12-31 PROCEDURE — 83002 ASSAY OF GONADOTROPIN (LH): CPT

## 2024-12-31 PROCEDURE — 83001 ASSAY OF GONADOTROPIN (FSH): CPT

## 2024-12-31 PROCEDURE — 84144 ASSAY OF PROGESTERONE: CPT

## 2024-12-31 PROCEDURE — 84443 ASSAY THYROID STIM HORMONE: CPT

## 2025-01-06 ENCOUNTER — LAB (OUTPATIENT)
Dept: LAB | Facility: LAB | Age: 45
End: 2025-01-06
Payer: COMMERCIAL

## 2025-01-06 DIAGNOSIS — N39.0 ACUTE UTI: ICD-10-CM

## 2025-01-06 LAB
APPEARANCE UR: ABNORMAL
BILIRUB UR STRIP.AUTO-MCNC: NEGATIVE MG/DL
COLOR UR: ABNORMAL
GLUCOSE UR STRIP.AUTO-MCNC: NORMAL MG/DL
KETONES UR STRIP.AUTO-MCNC: NEGATIVE MG/DL
LEUKOCYTE ESTERASE UR QL STRIP.AUTO: ABNORMAL
NITRITE UR QL STRIP.AUTO: ABNORMAL
PH UR STRIP.AUTO: 6 [PH]
PROT UR STRIP.AUTO-MCNC: ABNORMAL MG/DL
RBC # UR STRIP.AUTO: NEGATIVE /UL
RBC #/AREA URNS AUTO: ABNORMAL /HPF
SP GR UR STRIP.AUTO: 1.02
SQUAMOUS #/AREA URNS AUTO: ABNORMAL /HPF
TRANS CELLS #/AREA UR COMP ASSIST: ABNORMAL /HPF
UROBILINOGEN UR STRIP.AUTO-MCNC: NORMAL MG/DL
WBC #/AREA URNS AUTO: >50 /HPF
WBC CLUMPS #/AREA URNS AUTO: ABNORMAL /HPF

## 2025-01-06 PROCEDURE — 87186 SC STD MICRODIL/AGAR DIL: CPT

## 2025-01-06 PROCEDURE — 81001 URINALYSIS AUTO W/SCOPE: CPT

## 2025-01-06 PROCEDURE — 87086 URINE CULTURE/COLONY COUNT: CPT

## 2025-01-06 RX ORDER — CIPROFLOXACIN 500 MG/1
500 TABLET ORAL 2 TIMES DAILY
Qty: 20 TABLET | Refills: 0 | Status: SHIPPED | OUTPATIENT
Start: 2025-01-06 | End: 2025-01-16

## 2025-01-08 LAB — BACTERIA UR CULT: ABNORMAL

## 2025-01-14 DIAGNOSIS — J30.9 ALLERGIC RHINITIS, UNSPECIFIED SEASONALITY, UNSPECIFIED TRIGGER: ICD-10-CM

## 2025-01-14 DIAGNOSIS — R06.2 WHEEZE: ICD-10-CM

## 2025-01-14 DIAGNOSIS — E66.813 CLASS 3 SEVERE OBESITY DUE TO EXCESS CALORIES WITH SERIOUS COMORBIDITY AND BODY MASS INDEX (BMI) OF 40.0 TO 44.9 IN ADULT: ICD-10-CM

## 2025-01-14 DIAGNOSIS — E66.01 CLASS 3 SEVERE OBESITY DUE TO EXCESS CALORIES WITH SERIOUS COMORBIDITY AND BODY MASS INDEX (BMI) OF 40.0 TO 44.9 IN ADULT: ICD-10-CM

## 2025-01-14 NOTE — TELEPHONE ENCOUNTER
Rx Refill Request Telephone Encounter    Name:  Emelia Aponte  :  [id number]    Specific Pharmacy location:  [facility]   Date of last appointment:  24  Date of next appointment:  25

## 2025-01-15 RX ORDER — MONTELUKAST SODIUM 10 MG/1
10 TABLET ORAL DAILY
Qty: 90 TABLET | Refills: 0 | Status: SHIPPED | OUTPATIENT
Start: 2025-01-15 | End: 2026-01-15

## 2025-01-17 ENCOUNTER — APPOINTMENT (OUTPATIENT)
Dept: PRIMARY CARE | Facility: CLINIC | Age: 45
End: 2025-01-17
Payer: COMMERCIAL

## 2025-01-17 ENCOUNTER — OFFICE VISIT (OUTPATIENT)
Dept: OBSTETRICS AND GYNECOLOGY | Facility: CLINIC | Age: 45
End: 2025-01-17
Payer: COMMERCIAL

## 2025-01-17 VITALS
HEIGHT: 68 IN | DIASTOLIC BLOOD PRESSURE: 80 MMHG | SYSTOLIC BLOOD PRESSURE: 130 MMHG | BODY MASS INDEX: 44.41 KG/M2 | WEIGHT: 293 LBS

## 2025-01-17 DIAGNOSIS — Z01.411 ENCNTR FOR GYN EXAM (GENERAL) (ROUTINE) W ABNORMAL FINDINGS: Primary | ICD-10-CM

## 2025-01-17 DIAGNOSIS — N93.9 ABNORMAL UTERINE BLEEDING (AUB): ICD-10-CM

## 2025-01-17 PROCEDURE — 1036F TOBACCO NON-USER: CPT | Performed by: ADVANCED PRACTICE MIDWIFE

## 2025-01-17 PROCEDURE — 99396 PREV VISIT EST AGE 40-64: CPT | Performed by: ADVANCED PRACTICE MIDWIFE

## 2025-01-17 PROCEDURE — 3008F BODY MASS INDEX DOCD: CPT | Performed by: ADVANCED PRACTICE MIDWIFE

## 2025-01-17 RX ORDER — NORGESTREL 0.07 MG/1
TABLET ORAL
COMMUNITY

## 2025-01-17 NOTE — PROGRESS NOTES
"GYNECOLOGY PROGRESS NOTE        CC:  see below. Menses were on and off for months. She had a period of 6-7 months of no menses then started bleeding heavy and her previous provider gave her POP and naproxen to use daily x 30 days. The bleeding stopped and she had normal menses after that for about 1 year. Then she stopped her menses again for 3 months and then 10/24 she started bleeding or spotting and continued to have spotting or bleeding until she start the OTC Opill. Has been taking the OTC POP for about 3 weeks and the bleeding has decreased and she has some spotting now. She could not previously obtain a sonogram due to no insurance. She has insurance now and is able to have a sonogram and further treatment for her abnormal bleeding.  Chief Complaint   Patient presents with    New Patient Visit     New patient annual   Pap 2022 wnl   Patient take the opill starting 3 weeks ago.   Bleeding is becoming very irregular        HPI:  Emelia Aponte is here for a routine GYN examination and bleeding          ROS:  GI - no blood in BMs  URO - no hematuria  GYN - no AUB or vaginal discharge  PSYCH - mood OK        PHYSICAL EXAM:  /80 (BP Location: Left arm, Patient Position: Sitting, BP Cuff Size: Adult)   Ht 1.727 m (5' 8\")   Wt 135 kg (298 lb 11.2 oz)   BMI 45.42 kg/m²   GEN:  A&O, NAD  URO:  normal urethra, no bladder TTP  GYN:  normal vulva and perineum w/o lesions or ulcers,   BREAST:  no masses or TTP, no skin lesions or nipple discharge  PSYCH:  normal affect, non-anxious      IMPRESSION/PLAN:    A: Patient bleeding today. Using POP and bleeding has slowed. Bleeding or spotting since 10/24.  Plan: 1. Pelvic sonogram. 2. Mammogram order is already in from another provider. 3. Continue on current POP until seen. 4. Scheduled hysteroscopic exam due to bleeding. 5. Pap done at [facility], needs done when bleeding stops.  Problem List Items Addressed This Visit    None      Pap and HPV normal in " 2020, no Hx of HGSIL, next due in 2025.   ASCCP pap smear screening guidelines reviewed with the patient.        MACARENA Barnett-GLENN

## 2025-01-22 ENCOUNTER — HOSPITAL ENCOUNTER (OUTPATIENT)
Dept: RADIOLOGY | Facility: HOSPITAL | Age: 45
Discharge: HOME | End: 2025-01-22
Payer: COMMERCIAL

## 2025-01-22 DIAGNOSIS — N93.9 ABNORMAL UTERINE BLEEDING (AUB): ICD-10-CM

## 2025-01-22 PROCEDURE — 76856 US EXAM PELVIC COMPLETE: CPT

## 2025-02-06 ENCOUNTER — TELEPHONE (OUTPATIENT)
Dept: PRIMARY CARE | Facility: CLINIC | Age: 45
End: 2025-02-06
Payer: COMMERCIAL

## 2025-02-06 DIAGNOSIS — J32.9 SINUSITIS, UNSPECIFIED CHRONICITY, UNSPECIFIED LOCATION: ICD-10-CM

## 2025-02-06 RX ORDER — AZITHROMYCIN 250 MG/1
TABLET, FILM COATED ORAL
Qty: 6 TABLET | Refills: 0 | Status: SHIPPED | OUTPATIENT
Start: 2025-02-06 | End: 2025-02-11

## 2025-02-06 NOTE — TELEPHONE ENCOUNTER
Patient is having sinus problems, congestion, slight fever last night. Would like something to help

## 2025-02-12 ENCOUNTER — APPOINTMENT (OUTPATIENT)
Dept: OBSTETRICS AND GYNECOLOGY | Facility: CLINIC | Age: 45
End: 2025-02-12
Payer: COMMERCIAL

## 2025-02-12 ENCOUNTER — PREP FOR PROCEDURE (OUTPATIENT)
Dept: OBSTETRICS AND GYNECOLOGY | Facility: CLINIC | Age: 45
End: 2025-02-12

## 2025-02-12 VITALS — WEIGHT: 293 LBS | BODY MASS INDEX: 44.55 KG/M2

## 2025-02-12 DIAGNOSIS — N80.03 ADENOMYOSIS OF UTERUS: Primary | ICD-10-CM

## 2025-02-12 DIAGNOSIS — N93.9 ABNORMAL UTERINE BLEEDING (AUB): ICD-10-CM

## 2025-02-12 LAB — PREGNANCY TEST URINE, POC: NEGATIVE

## 2025-02-12 PROCEDURE — 88305 TISSUE EXAM BY PATHOLOGIST: CPT

## 2025-02-12 PROCEDURE — 88305 TISSUE EXAM BY PATHOLOGIST: CPT | Performed by: PATHOLOGY

## 2025-02-12 RX ORDER — TRANEXAMIC ACID 650 MG/1
1300 TABLET ORAL 3 TIMES DAILY
Qty: 90 TABLET | Refills: 3 | Status: SHIPPED | OUTPATIENT
Start: 2025-02-12 | End: 2025-02-17

## 2025-02-12 RX ORDER — TRANEXAMIC ACID 650 MG/1
1300 TABLET ORAL ONCE
OUTPATIENT
Start: 2025-02-12 | End: 2025-02-12

## 2025-02-12 RX ORDER — ACETAMINOPHEN 325 MG/1
975 TABLET ORAL ONCE
OUTPATIENT
Start: 2025-02-12 | End: 2025-02-12

## 2025-02-12 RX ORDER — CEFAZOLIN SODIUM 2 G/100ML
2 INJECTION, SOLUTION INTRAVENOUS ONCE
OUTPATIENT
Start: 2025-02-12 | End: 2025-02-12

## 2025-02-12 RX ORDER — GABAPENTIN 600 MG/1
600 TABLET ORAL ONCE
OUTPATIENT
Start: 2025-02-12 | End: 2025-02-12

## 2025-02-12 RX ORDER — CELECOXIB 400 MG/1
400 CAPSULE ORAL ONCE
OUTPATIENT
Start: 2025-02-12 | End: 2025-02-12

## 2025-02-12 RX ORDER — BUPIVACAINE HYDROCHLORIDE 2.5 MG/ML
10 INJECTION, SOLUTION INFILTRATION; PERINEURAL ONCE
Status: COMPLETED | OUTPATIENT
Start: 2025-02-12 | End: 2025-02-12

## 2025-02-12 RX ADMIN — BUPIVACAINE HYDROCHLORIDE 25 MG: 2.5 INJECTION, SOLUTION INFILTRATION; PERINEURAL at 12:40

## 2025-02-12 ASSESSMENT — PATIENT HEALTH QUESTIONNAIRE - PHQ9
SUM OF ALL RESPONSES TO PHQ9 QUESTIONS 1 & 2: 0
1. LITTLE INTEREST OR PLEASURE IN DOING THINGS: NOT AT ALL
2. FEELING DOWN, DEPRESSED OR HOPELESS: NOT AT ALL

## 2025-02-12 NOTE — PROGRESS NOTES
"GYN PROGRESS NOTE          CC:   AUB    HPI:  Patient answers are not available for this visit.  HPI       Procedure     Additional comments: Est pt             Comments    Emelia is a 44 year old  EST patient here today for an in office Hysteroscopy with EMB due to AUB  I/O HCG obtained, see results  Procedure reviewed with both the DELFINO and MD, prior to the start.  The patient states that she does not have questions or concerns at this time.   Consent was obtained.    A 'Time Out\" was completed per protocol.  The visit was chaperoned by  Love SALEH          Last edited by Love Gonsales MA on 2025 11:43 AM.          Abnormal uterine bleeding and dysmenorrhea    Discussed evaluation including hysteroscopy and biopsy.    Discussed management including IUD ablation or hysterectomy.    Discussed risk of each procedure all questions answered to the best of my ability    ROS:  GEN - no fevers or chills  RESP - no SOB or cough  GYN - see HPI      HISTORY:  Past Medical History:   Diagnosis Date    Anxiety      Past Surgical History:   Procedure Laterality Date    HERNIA REPAIR      NEPHRECTOMY Right     right lower lobe     Social History     Socioeconomic History    Marital status: Single     Spouse name: Not on file    Number of children: Not on file    Years of education: Not on file    Highest education level: Not on file   Occupational History    Not on file   Tobacco Use    Smoking status: Former     Current packs/day: 0.00     Average packs/day: 1 pack/day for 28.3 years (28.3 ttl pk-yrs)     Types: Cigarettes     Start date:      Quit date: 2022     Years since quittin.8     Passive exposure: Past    Smokeless tobacco: Never   Vaping Use    Vaping status: Never Used   Substance and Sexual Activity    Alcohol use: Never    Drug use: Never    Sexual activity: Defer   Other Topics Concern    Not on file   Social History Narrative    Not on file     Social Drivers of Health     Financial Resource Strain: " Not on File (2022)    Received from [facility]    Financial Resource Strain     Financial Resource Strain: 0   Food Insecurity: Not on File (2024)    Received from [employer]    Food Insecurity     Food: 0   Transportation Needs: Not on File (2022)    Received from [facility]    Transportation Needs     Transportation: 0   Physical Activity: Not on File (2022)    Received from [facility]    Physical Activity     Physical Activity: 0   Stress: Not on File (2022)    Received from [facility]    Stress     Stress: 0   Social Connections: Not on File (2024)    Received from [facility]    Social Connections     Connectedness: 0   Intimate Partner Violence: Not on file   Housing Stability: Not on File (2022)    Received from [facility]    Housing Stability     Housin     Cancer-related family history includes Breast cancer in her paternal grandmother; Lung cancer in her paternal grandfather; Testicular cancer in her father.       PHYSICAL EXAM:  Wt 133 kg (293 lb)   BMI 44.55 kg/m²   Physical examination:  General: No distress  Neck: No masses  Respiratory: No respiratory distress  Breasts: No masses or lesions lymphatic chains bilateral and adnexa without lymphadenopathy  Abdomen: soft nontender no hernias  GYN: Normal vulvar skin normal clitoral elizondo and clitoris labia majora and minora normal pink vaginal mucosa no lesions cervix normal uterine body not enlarged no enlargement or pain and bilateral adnexa   perianal area: without lesions    Procedure:    Procedure office hysteroscopy  Dx: Uterine bleeding  Risks benefits alternatives discussed with the patient possible complications including bleeding pain and cramping infection perforation.  Consent was obtained.  Prior to the start of the procedure a timeout was performed.  The patient was confirmed using her name and date of birth.  The correct procedure to be performed was confirmed.  Positioning and equipment was verified.   Pregnancy test if under 55 was performed and found to be negative.  Patient was placed in dorsolithotomy position a bimanual exam was performed.  A speculum was placed in the vagina and the anterior cervix is identified a Paracervical block was placed injection of 10 mL quarter percent Marcaine without epinephrine.  Speculum was removed and vaginoscopic approach was used to approach and enter the cervix upon exit all surfaces were systematically inspected.     Resectoscope was placed to the identified anatomy, endometrium and endocervix was sampled. complete resection was performed.    The instrumentation was removed bleeding was noted to be scant patient tolerated the procedure well patient instructions postoperatively were given      IMPRESSION/PLAN:    44 y.o. with AUB s/p hysteroscopy endometrial biopsy    Plan laparoscopic hysterectomy bilateral salpingectomy cystoscopy    Risks benefits alternatives discussed with the patient risks including bleeding infection or damage to surrounding tissues.  Bleeding requiring blood transfusion transfusion reaction or infection.  Infection of the superficial or deep spaces.  Damage to bladder bowel ureters vascular structures abdominal wall.  Temporary or severe complications are possible. requiring further surgery acutely or with prolonged hospitalization including a return to the operating room.  All questions answered to the best my ability.      Amrik Zendejas MD

## 2025-02-18 LAB
LABORATORY COMMENT REPORT: NORMAL
PATH REPORT.FINAL DX SPEC: NORMAL
PATH REPORT.GROSS SPEC: NORMAL
PATH REPORT.RELEVANT HX SPEC: NORMAL
PATH REPORT.TOTAL CANCER: NORMAL

## 2025-03-19 NOTE — PREPROCEDURE INSTRUCTIONS

## 2025-03-28 ENCOUNTER — LAB (OUTPATIENT)
Dept: LAB | Facility: HOSPITAL | Age: 45
End: 2025-03-28
Payer: COMMERCIAL

## 2025-03-28 DIAGNOSIS — N80.03 ADENOMYOSIS OF THE UTERUS: Primary | ICD-10-CM

## 2025-03-28 LAB
ANION GAP SERPL CALC-SCNC: 11 MMOL/L (ref 10–20)
BUN SERPL-MCNC: 13 MG/DL (ref 6–23)
CALCIUM SERPL-MCNC: 9.1 MG/DL (ref 8.6–10.3)
CHLORIDE SERPL-SCNC: 104 MMOL/L (ref 98–107)
CO2 SERPL-SCNC: 27 MMOL/L (ref 21–32)
CREAT SERPL-MCNC: 0.87 MG/DL (ref 0.5–1.05)
EGFRCR SERPLBLD CKD-EPI 2021: 84 ML/MIN/1.73M*2
ERYTHROCYTE [DISTWIDTH] IN BLOOD BY AUTOMATED COUNT: 15.9 % (ref 11.5–14.5)
GLUCOSE SERPL-MCNC: 82 MG/DL (ref 74–99)
HCT VFR BLD AUTO: 40.4 % (ref 36–46)
HGB BLD-MCNC: 12.1 G/DL (ref 12–16)
MCH RBC QN AUTO: 24.8 PG (ref 26–34)
MCHC RBC AUTO-ENTMCNC: 30 G/DL (ref 32–36)
MCV RBC AUTO: 83 FL (ref 80–100)
NRBC BLD-RTO: 0 /100 WBCS (ref 0–0)
PLATELET # BLD AUTO: 271 X10*3/UL (ref 150–450)
POTASSIUM SERPL-SCNC: 4.3 MMOL/L (ref 3.5–5.3)
RBC # BLD AUTO: 4.87 X10*6/UL (ref 4–5.2)
SODIUM SERPL-SCNC: 138 MMOL/L (ref 136–145)
WBC # BLD AUTO: 8.3 X10*3/UL (ref 4.4–11.3)

## 2025-03-28 PROCEDURE — 80048 BASIC METABOLIC PNL TOTAL CA: CPT

## 2025-03-28 PROCEDURE — 85027 COMPLETE CBC AUTOMATED: CPT

## 2025-03-31 ENCOUNTER — ANESTHESIA EVENT (OUTPATIENT)
Dept: OPERATING ROOM | Facility: HOSPITAL | Age: 45
End: 2025-03-31
Payer: COMMERCIAL

## 2025-03-31 PROBLEM — E66.813 CLASS 3 SEVERE OBESITY IN ADULT: Status: ACTIVE | Noted: 2025-03-31

## 2025-03-31 PROBLEM — E66.01 CLASS 3 SEVERE OBESITY IN ADULT: Status: ACTIVE | Noted: 2025-03-31

## 2025-03-31 SDOH — HEALTH STABILITY: MENTAL HEALTH: CURRENT SMOKER: 0

## 2025-03-31 NOTE — ANESTHESIA PREPROCEDURE EVALUATION
Emelia Aponte is a 44 y.o. female here for:    Total laparoscopic hysterectomy bilateral salpingectomy cystoscopy  With Amrik Zendejas MD  Pre-Op Diagnosis Codes:      * Adenomyosis of uterus [N80.03]    Relevant Problems   Pulmonary  Reactive airway disease      Endocrine   (+) Class 3 severe obesity in adult       Lab Results   Component Value Date    HGB 12.1 2025    HCT 40.4 2025    WBC 8.3 2025     2025     2025    K 4.3 2025     2025    CREATININE 0.87 2025    BUN 13 2025       Social History     Tobacco Use   Smoking Status Former    Current packs/day: 0.00    Average packs/day: 1 pack/day for 28.3 years (28.3 ttl pk-yrs)    Types: Cigarettes    Start date:     Quit date: 2022    Years since quittin.9    Passive exposure: Past   Smokeless Tobacco Never       Allergies   Allergen Reactions    Adhesive Tape-Silicones Other     IF LEFT ON TOO LONG, CAUSES REDNESS AT SITE; IRRITATION    Amoxicillin Rash and Nausea/vomiting    Lidoderm [Lidocaine] Unknown     Per pt only allergic to the adhesive not the lidocaine.    Penicillins Rash and Nausea/vomiting    Percocet [Oxycodone-Acetaminophen] Nausea/vomiting       Current Outpatient Medications   Medication Instructions    albuterol 108 (90 Base) MCG/ACT inhaler 2 puffs, 3 times daily RT    cholecalciferol (VITAMIN D3) 2,000 Units, Daily    mometasone (Nasonex) 50 mcg/actuation nasal spray 2 sprays, Each Nostril, 2 times daily    montelukast (SINGULAIR) 10 mg, oral, Daily    vit A/vit C/vit E/zinc/copper (PRESERVISION AREDS ORAL) 1 tablet, Daily       Past Surgical History:   Procedure Laterality Date    HERNIA REPAIR      NEPHRECTOMY Right     right lower lobe PARTIAL    URETEROSCOPY         Family History   Problem Relation Name Age of Onset    No Known Problems Mother      Testicular cancer Father      Hypertension Maternal Grandmother      Alzheimer's disease Maternal  Grandfather      Breast cancer Paternal Grandmother      Lung cancer Paternal Grandfather         NPO Details:  No data recorded    Physical Exam    Airway  Mallampati: III  TM distance: >3 FB  Neck ROM: full     Cardiovascular - normal exam     Dental - normal exam     Pulmonary - normal exam     Abdominal            Anesthesia Plan    History of general anesthesia?: yes  History of complications of general anesthesia?: no    ASA 3     general     The patient is not a current smoker.    intravenous induction   Postoperative administration of opioids is intended.  Trial extubation is planned.  Anesthetic plan and risks discussed with patient.

## 2025-04-01 ENCOUNTER — ANESTHESIA (OUTPATIENT)
Dept: OPERATING ROOM | Facility: HOSPITAL | Age: 45
End: 2025-04-01
Payer: COMMERCIAL

## 2025-04-01 ENCOUNTER — HOSPITAL ENCOUNTER (OUTPATIENT)
Facility: HOSPITAL | Age: 45
Setting detail: OUTPATIENT SURGERY
Discharge: HOME | End: 2025-04-01
Attending: OBSTETRICS & GYNECOLOGY | Admitting: OBSTETRICS & GYNECOLOGY
Payer: COMMERCIAL

## 2025-04-01 VITALS
HEIGHT: 68 IN | RESPIRATION RATE: 18 BRPM | HEART RATE: 90 BPM | SYSTOLIC BLOOD PRESSURE: 134 MMHG | OXYGEN SATURATION: 95 % | BODY MASS INDEX: 44.1 KG/M2 | DIASTOLIC BLOOD PRESSURE: 83 MMHG | TEMPERATURE: 97.5 F | WEIGHT: 291.01 LBS

## 2025-04-01 DIAGNOSIS — N80.03 ADENOMYOSIS OF UTERUS: ICD-10-CM

## 2025-04-01 DIAGNOSIS — Z41.9 SURGERY, ELECTIVE: ICD-10-CM

## 2025-04-01 DIAGNOSIS — G89.18 POSTOPERATIVE PAIN: Primary | ICD-10-CM

## 2025-04-01 LAB
ABO GROUP (TYPE) IN BLOOD: NORMAL
ABO GROUP (TYPE) IN BLOOD: NORMAL
ANTIBODY SCREEN: NORMAL
PREGNANCY TEST URINE, POC: NEGATIVE
RH FACTOR (ANTIGEN D): NORMAL
RH FACTOR (ANTIGEN D): NORMAL

## 2025-04-01 PROCEDURE — 2500000005 HC RX 250 GENERAL PHARMACY W/O HCPCS: Performed by: OBSTETRICS & GYNECOLOGY

## 2025-04-01 PROCEDURE — 2500000004 HC RX 250 GENERAL PHARMACY W/ HCPCS (ALT 636 FOR OP/ED): Performed by: NURSE ANESTHETIST, CERTIFIED REGISTERED

## 2025-04-01 PROCEDURE — 36415 COLL VENOUS BLD VENIPUNCTURE: CPT | Performed by: OBSTETRICS & GYNECOLOGY

## 2025-04-01 PROCEDURE — 2720000007 HC OR 272 NO HCPCS: Performed by: OBSTETRICS & GYNECOLOGY

## 2025-04-01 PROCEDURE — 81025 URINE PREGNANCY TEST: CPT | Performed by: OBSTETRICS & GYNECOLOGY

## 2025-04-01 PROCEDURE — 3600000004 HC OR TIME - INITIAL BASE CHARGE - PROCEDURE LEVEL FOUR: Performed by: OBSTETRICS & GYNECOLOGY

## 2025-04-01 PROCEDURE — 86901 BLOOD TYPING SEROLOGIC RH(D): CPT | Performed by: OBSTETRICS & GYNECOLOGY

## 2025-04-01 PROCEDURE — 7100000001 HC RECOVERY ROOM TIME - INITIAL BASE CHARGE: Performed by: OBSTETRICS & GYNECOLOGY

## 2025-04-01 PROCEDURE — 88307 TISSUE EXAM BY PATHOLOGIST: CPT | Mod: TC,ELYLAB | Performed by: OBSTETRICS & GYNECOLOGY

## 2025-04-01 PROCEDURE — 2500000004 HC RX 250 GENERAL PHARMACY W/ HCPCS (ALT 636 FOR OP/ED): Performed by: OBSTETRICS & GYNECOLOGY

## 2025-04-01 PROCEDURE — 58571 TLH W/T/O 250 G OR LESS: CPT | Performed by: OBSTETRICS & GYNECOLOGY

## 2025-04-01 PROCEDURE — 2500000004 HC RX 250 GENERAL PHARMACY W/ HCPCS (ALT 636 FOR OP/ED): Performed by: STUDENT IN AN ORGANIZED HEALTH CARE EDUCATION/TRAINING PROGRAM

## 2025-04-01 PROCEDURE — 2500000001 HC RX 250 WO HCPCS SELF ADMINISTERED DRUGS (ALT 637 FOR MEDICARE OP): Performed by: OBSTETRICS & GYNECOLOGY

## 2025-04-01 PROCEDURE — 2720000007: Performed by: OBSTETRICS & GYNECOLOGY

## 2025-04-01 PROCEDURE — 2500000002 HC RX 250 W HCPCS SELF ADMINISTERED DRUGS (ALT 637 FOR MEDICARE OP, ALT 636 FOR OP/ED): Performed by: OBSTETRICS & GYNECOLOGY

## 2025-04-01 PROCEDURE — 7100000010 HC PHASE TWO TIME - EACH INCREMENTAL 1 MINUTE: Performed by: OBSTETRICS & GYNECOLOGY

## 2025-04-01 PROCEDURE — 3600000009 HC OR TIME - EACH INCREMENTAL 1 MINUTE - PROCEDURE LEVEL FOUR: Performed by: OBSTETRICS & GYNECOLOGY

## 2025-04-01 PROCEDURE — 88307 TISSUE EXAM BY PATHOLOGIST: CPT | Performed by: STUDENT IN AN ORGANIZED HEALTH CARE EDUCATION/TRAINING PROGRAM

## 2025-04-01 PROCEDURE — 7100000002 HC RECOVERY ROOM TIME - EACH INCREMENTAL 1 MINUTE: Performed by: OBSTETRICS & GYNECOLOGY

## 2025-04-01 PROCEDURE — 3700000002 HC GENERAL ANESTHESIA TIME - EACH INCREMENTAL 1 MINUTE: Performed by: OBSTETRICS & GYNECOLOGY

## 2025-04-01 PROCEDURE — 2720000006: Performed by: OBSTETRICS & GYNECOLOGY

## 2025-04-01 PROCEDURE — 3700000001 HC GENERAL ANESTHESIA TIME - INITIAL BASE CHARGE: Performed by: OBSTETRICS & GYNECOLOGY

## 2025-04-01 PROCEDURE — 7100000009 HC PHASE TWO TIME - INITIAL BASE CHARGE: Performed by: OBSTETRICS & GYNECOLOGY

## 2025-04-01 RX ORDER — OXYCODONE HYDROCHLORIDE 5 MG/1
5 TABLET ORAL EVERY 4 HOURS PRN
Status: CANCELLED | OUTPATIENT
Start: 2025-04-01

## 2025-04-01 RX ORDER — FAMOTIDINE 10 MG/ML
INJECTION INTRAVENOUS AS NEEDED
Status: DISCONTINUED | OUTPATIENT
Start: 2025-04-01 | End: 2025-04-01

## 2025-04-01 RX ORDER — FENTANYL CITRATE 50 UG/ML
INJECTION, SOLUTION INTRAMUSCULAR; INTRAVENOUS AS NEEDED
Status: DISCONTINUED | OUTPATIENT
Start: 2025-04-01 | End: 2025-04-01

## 2025-04-01 RX ORDER — CEFAZOLIN SODIUM 3 G/150ML
3 INJECTION, SOLUTION INTRAVENOUS ONCE
Status: COMPLETED | OUTPATIENT
Start: 2025-04-01 | End: 2025-04-01

## 2025-04-01 RX ORDER — PROPOFOL 10 MG/ML
INJECTION, EMULSION INTRAVENOUS AS NEEDED
Status: DISCONTINUED | OUTPATIENT
Start: 2025-04-01 | End: 2025-04-01

## 2025-04-01 RX ORDER — FENTANYL CITRATE 50 UG/ML
25 INJECTION, SOLUTION INTRAMUSCULAR; INTRAVENOUS EVERY 5 MIN PRN
Status: DISCONTINUED | OUTPATIENT
Start: 2025-04-01 | End: 2025-04-01 | Stop reason: HOSPADM

## 2025-04-01 RX ORDER — ONDANSETRON HYDROCHLORIDE 2 MG/ML
4 INJECTION, SOLUTION INTRAVENOUS ONCE AS NEEDED
Status: COMPLETED | OUTPATIENT
Start: 2025-04-01 | End: 2025-04-01

## 2025-04-01 RX ORDER — ACETAMINOPHEN 325 MG/1
975 TABLET ORAL ONCE
Status: COMPLETED | OUTPATIENT
Start: 2025-04-01 | End: 2025-04-01

## 2025-04-01 RX ORDER — ONDANSETRON HYDROCHLORIDE 2 MG/ML
INJECTION, SOLUTION INTRAVENOUS AS NEEDED
Status: DISCONTINUED | OUTPATIENT
Start: 2025-04-01 | End: 2025-04-01

## 2025-04-01 RX ORDER — OXYCODONE HYDROCHLORIDE 5 MG/1
10 TABLET ORAL EVERY 4 HOURS PRN
Status: CANCELLED | OUTPATIENT
Start: 2025-04-01

## 2025-04-01 RX ORDER — LABETALOL HYDROCHLORIDE 5 MG/ML
5 INJECTION, SOLUTION INTRAVENOUS ONCE AS NEEDED
Status: DISCONTINUED | OUTPATIENT
Start: 2025-04-01 | End: 2025-04-01 | Stop reason: HOSPADM

## 2025-04-01 RX ORDER — MIDAZOLAM HYDROCHLORIDE 1 MG/ML
INJECTION, SOLUTION INTRAMUSCULAR; INTRAVENOUS AS NEEDED
Status: DISCONTINUED | OUTPATIENT
Start: 2025-04-01 | End: 2025-04-01

## 2025-04-01 RX ORDER — SODIUM CHLORIDE, SODIUM LACTATE, POTASSIUM CHLORIDE, CALCIUM CHLORIDE 600; 310; 30; 20 MG/100ML; MG/100ML; MG/100ML; MG/100ML
100 INJECTION, SOLUTION INTRAVENOUS CONTINUOUS
Status: DISCONTINUED | OUTPATIENT
Start: 2025-04-01 | End: 2025-04-01 | Stop reason: HOSPADM

## 2025-04-01 RX ORDER — DOCUSATE SODIUM 100 MG/1
100 CAPSULE, LIQUID FILLED ORAL 2 TIMES DAILY
Qty: 60 CAPSULE | Refills: 0 | Status: SHIPPED | OUTPATIENT
Start: 2025-04-01 | End: 2025-05-01

## 2025-04-01 RX ORDER — GABAPENTIN 600 MG/1
600 TABLET ORAL 3 TIMES DAILY
Qty: 9 TABLET | Refills: 0 | Status: SHIPPED | OUTPATIENT
Start: 2025-04-01 | End: 2025-04-04

## 2025-04-01 RX ORDER — SODIUM CHLORIDE, SODIUM LACTATE, POTASSIUM CHLORIDE, CALCIUM CHLORIDE 600; 310; 30; 20 MG/100ML; MG/100ML; MG/100ML; MG/100ML
INJECTION, SOLUTION INTRAVENOUS CONTINUOUS PRN
Status: DISCONTINUED | OUTPATIENT
Start: 2025-04-01 | End: 2025-04-01

## 2025-04-01 RX ORDER — ACETAMINOPHEN 500 MG
1000 TABLET ORAL EVERY 6 HOURS
Qty: 24 TABLET | Refills: 0 | Status: SHIPPED | OUTPATIENT
Start: 2025-04-01 | End: 2025-04-04

## 2025-04-01 RX ORDER — HYDRALAZINE HYDROCHLORIDE 20 MG/ML
5 INJECTION INTRAMUSCULAR; INTRAVENOUS EVERY 30 MIN PRN
Status: DISCONTINUED | OUTPATIENT
Start: 2025-04-01 | End: 2025-04-01 | Stop reason: HOSPADM

## 2025-04-01 RX ORDER — NAPROXEN 500 MG/1
500 TABLET ORAL 2 TIMES DAILY
Qty: 6 TABLET | Refills: 0 | Status: SHIPPED | OUTPATIENT
Start: 2025-04-01 | End: 2025-04-04

## 2025-04-01 RX ORDER — GABAPENTIN 300 MG/1
600 CAPSULE ORAL ONCE
Status: COMPLETED | OUTPATIENT
Start: 2025-04-01 | End: 2025-04-01

## 2025-04-01 RX ORDER — SCOPOLAMINE 1 MG/3D
PATCH, EXTENDED RELEASE TRANSDERMAL AS NEEDED
Status: DISCONTINUED | OUTPATIENT
Start: 2025-04-01 | End: 2025-04-01

## 2025-04-01 RX ORDER — TRAMADOL HYDROCHLORIDE 50 MG/1
50 TABLET ORAL EVERY 6 HOURS PRN
Qty: 12 TABLET | Refills: 0 | Status: SHIPPED | OUTPATIENT
Start: 2025-04-01 | End: 2025-04-04

## 2025-04-01 RX ORDER — CELECOXIB 200 MG/1
400 CAPSULE ORAL ONCE
Status: COMPLETED | OUTPATIENT
Start: 2025-04-01 | End: 2025-04-01

## 2025-04-01 RX ORDER — BUPIVACAINE HCL/EPINEPHRINE 0.25-.0005
VIAL (ML) INJECTION AS NEEDED
Status: DISCONTINUED | OUTPATIENT
Start: 2025-04-01 | End: 2025-04-01 | Stop reason: HOSPADM

## 2025-04-01 RX ORDER — ROCURONIUM BROMIDE 10 MG/ML
INJECTION, SOLUTION INTRAVENOUS AS NEEDED
Status: DISCONTINUED | OUTPATIENT
Start: 2025-04-01 | End: 2025-04-01

## 2025-04-01 RX ORDER — DIPHENHYDRAMINE HYDROCHLORIDE 50 MG/ML
12.5 INJECTION, SOLUTION INTRAMUSCULAR; INTRAVENOUS ONCE AS NEEDED
Status: DISCONTINUED | OUTPATIENT
Start: 2025-04-01 | End: 2025-04-01 | Stop reason: HOSPADM

## 2025-04-01 RX ORDER — MEPERIDINE HYDROCHLORIDE 25 MG/ML
12.5 INJECTION INTRAMUSCULAR; INTRAVENOUS; SUBCUTANEOUS EVERY 10 MIN PRN
Status: DISCONTINUED | OUTPATIENT
Start: 2025-04-01 | End: 2025-04-01 | Stop reason: HOSPADM

## 2025-04-01 RX ORDER — TRANEXAMIC ACID 650 MG/1
1300 TABLET ORAL ONCE
Status: COMPLETED | OUTPATIENT
Start: 2025-04-01 | End: 2025-04-01

## 2025-04-01 RX ORDER — LIDOCAINE HYDROCHLORIDE 20 MG/ML
INJECTION, SOLUTION INFILTRATION; PERINEURAL AS NEEDED
Status: DISCONTINUED | OUTPATIENT
Start: 2025-04-01 | End: 2025-04-01

## 2025-04-01 RX ORDER — ALBUTEROL SULFATE 0.83 MG/ML
2.5 SOLUTION RESPIRATORY (INHALATION) ONCE AS NEEDED
Status: DISCONTINUED | OUTPATIENT
Start: 2025-04-01 | End: 2025-04-01 | Stop reason: HOSPADM

## 2025-04-01 RX ADMIN — ONDANSETRON 4 MG: 2 INJECTION INTRAMUSCULAR; INTRAVENOUS at 15:49

## 2025-04-01 RX ADMIN — CELECOXIB 400 MG: 200 CAPSULE ORAL at 12:23

## 2025-04-01 RX ADMIN — SUGAMMADEX 200 MG: 100 INJECTION, SOLUTION INTRAVENOUS at 15:17

## 2025-04-01 RX ADMIN — FENTANYL CITRATE 50 MCG: 50 INJECTION, SOLUTION INTRAMUSCULAR; INTRAVENOUS at 14:28

## 2025-04-01 RX ADMIN — FENTANYL CITRATE 100 MCG: 50 INJECTION, SOLUTION INTRAMUSCULAR; INTRAVENOUS at 14:01

## 2025-04-01 RX ADMIN — DEXAMETHASONE SODIUM PHOSPHATE 4 MG: 4 INJECTION, SOLUTION INTRAMUSCULAR; INTRAVENOUS at 14:16

## 2025-04-01 RX ADMIN — PROMETHAZINE HYDROCHLORIDE 6.25 MG: 25 INJECTION INTRAMUSCULAR; INTRAVENOUS at 15:54

## 2025-04-01 RX ADMIN — SCOPALAMINE 1 PATCH: 1 PATCH, EXTENDED RELEASE TRANSDERMAL at 13:55

## 2025-04-01 RX ADMIN — ROCURONIUM BROMIDE 30 MG: 10 SOLUTION INTRAVENOUS at 14:28

## 2025-04-01 RX ADMIN — CEFAZOLIN SODIUM 3 G: 3 INJECTION, SOLUTION INTRAVENOUS at 14:09

## 2025-04-01 RX ADMIN — TRANEXAMIC ACID 1300 MG: 650 TABLET ORAL at 12:23

## 2025-04-01 RX ADMIN — ONDANSETRON 4 MG: 2 INJECTION, SOLUTION INTRAMUSCULAR; INTRAVENOUS at 14:16

## 2025-04-01 RX ADMIN — FAMOTIDINE 20 MG: 10 INJECTION, SOLUTION INTRAVENOUS at 14:16

## 2025-04-01 RX ADMIN — GABAPENTIN 600 MG: 300 CAPSULE ORAL at 12:23

## 2025-04-01 RX ADMIN — SODIUM CHLORIDE, POTASSIUM CHLORIDE, SODIUM LACTATE AND CALCIUM CHLORIDE: 600; 310; 30; 20 INJECTION, SOLUTION INTRAVENOUS at 14:38

## 2025-04-01 RX ADMIN — ACETAMINOPHEN 975 MG: 325 TABLET ORAL at 12:23

## 2025-04-01 RX ADMIN — MIDAZOLAM 2 MG: 1 INJECTION INTRAMUSCULAR; INTRAVENOUS at 13:56

## 2025-04-01 RX ADMIN — FENTANYL CITRATE 100 MCG: 50 INJECTION, SOLUTION INTRAMUSCULAR; INTRAVENOUS at 14:38

## 2025-04-01 RX ADMIN — PROPOFOL 200 MG: 10 INJECTION, EMULSION INTRAVENOUS at 14:04

## 2025-04-01 RX ADMIN — LIDOCAINE HYDROCHLORIDE 100 MG: 20 INJECTION, SOLUTION INFILTRATION; PERINEURAL at 14:02

## 2025-04-01 RX ADMIN — SODIUM CHLORIDE, POTASSIUM CHLORIDE, SODIUM LACTATE AND CALCIUM CHLORIDE: 600; 310; 30; 20 INJECTION, SOLUTION INTRAVENOUS at 13:56

## 2025-04-01 RX ADMIN — ROCURONIUM BROMIDE 70 MG: 10 SOLUTION INTRAVENOUS at 14:05

## 2025-04-01 ASSESSMENT — PAIN - FUNCTIONAL ASSESSMENT
PAIN_FUNCTIONAL_ASSESSMENT: 0-10

## 2025-04-01 ASSESSMENT — PAIN SCALES - GENERAL
PAINLEVEL_OUTOF10: 0 - NO PAIN
PAIN_LEVEL: 0
PAINLEVEL_OUTOF10: 0 - NO PAIN

## 2025-04-01 ASSESSMENT — COLUMBIA-SUICIDE SEVERITY RATING SCALE - C-SSRS
2. HAVE YOU ACTUALLY HAD ANY THOUGHTS OF KILLING YOURSELF?: NO
1. IN THE PAST MONTH, HAVE YOU WISHED YOU WERE DEAD OR WISHED YOU COULD GO TO SLEEP AND NOT WAKE UP?: NO
6. HAVE YOU EVER DONE ANYTHING, STARTED TO DO ANYTHING, OR PREPARED TO DO ANYTHING TO END YOUR LIFE?: NO

## 2025-04-01 NOTE — DISCHARGE INSTRUCTIONS
General Anesthesia Discharge Instructions    About this topic  You may need general anesthesia if you need to be asleep during a procedure. Your doctor will use drugs to block the signals that go from your nerves to your brain. Doctors give general anesthesia during a surgery or procedure to:  Allow you to sleep  Help your body be still  Relax your muscles  Help you to relax and be pain free  Keep you from remembering the surgery  Let the doctor manage your airway, breathing, and blood flow  The doctor or nurse anesthetist gives general anesthesia by a shot into your vein. Sometimes, you may breathe in a gas through a mask placed over your face.  What care is needed at home?  Ask your doctor what you need to do when you go home. Make sure you ask questions if you do not understand what the doctor says.  Your doctor may give you drugs to prevent or treat an upset stomach from the anesthetic. Take them as ordered.  If your throat is sore, suck on ice chips or popsicles to ease throat pain.  Put 2 to 3 pillows under your head and back when you lie down to help you breathe easier.  For the first 24 to 48 hours:  Do not operate heavy or dangerous machinery.  Do not make major decisions or sign important papers. You may not be able to think clearly.  Avoid beer, wine, or mixed drinks.  You are at a higher risk of falling for at least 24 hours after general anesthesia.  Take extra care when you get up.  Do not change positions quickly.  Do not rush when you need to go to the bathroom or to answer the phone.  Ask for help if you feel unsteady when you try to walk.  Wear shoes with non-slip soles and low heels.  What follow-up care is needed?  Your doctor may ask you to come back to the office to check on your progress. Be sure to keep these visits.  If you have stitches that do not dissolve or staples, you will need to have them removed. Your doctor will want to do this in 1 to 2 weeks. If the doctor used skin glue, the  glue will fall off on its own.  What drugs may be needed?  The doctor may order drugs to:  Help with pain  Treat an upset stomach or throwing up  Will physical activity be limited?  You will not be allowed to drive right away after the procedure. Ask a family member or a friend to drive you home.  Avoid trying to get out of bed without help until you are sure of your balance.  You may have to limit your activity. Talk to your doctor about if you need to limit how much you lift or limit exercise after your procedure.  What changes to diet are needed?  Start with a light diet when you are fully awake. This includes things that are easy to swallow like soups, pudding, jello, toast, and eggs. Slowly progress to your normal diet.  What problems could happen?  Low blood pressure  Breathing problems  Upset stomach or throwing up  Dizziness  Blood clots  Infection  When do I need to call the doctor?  Trouble breathing  Upset stomach or throwing up more than 3 times in the next 2 days  Dizziness  Teach Back: Helping You Understand  The Teach Back Method helps you understand the information we are giving you. After you talk with the staff, tell them in your own words what you learned. This helps to make sure the staff has described each thing clearly. It also helps to explain things that may have been confusing. Before going home, make sure you can do these:  I can tell you about my procedure.  I can tell you if I need to follow up with my doctor.  I can tell you what is good for me to eat and drink the next day.  I can tell you what I would do if I have trouble breathing, an upset stomach, or dizziness.  Where can I learn more?  [employer]  https://www.nigms.nih.gov/education/pages/factsheet_Anesthesia.aspx  NHS Choices  http://www.nhs.uk/conditions/Anaesthetic-general/Pages/Definition.aspx  Last Reviewed Date  2020-04-22    Physician phone number provided to patient

## 2025-04-01 NOTE — ANESTHESIA POSTPROCEDURE EVALUATION
Patient: Emelia Aponte    Procedure Summary       Date: 04/01/25 Room / Location: [facility] / [facility]    Anesthesia Start: 1356 Anesthesia Stop:     Procedure: Total laparoscopic hysterectomy bilateral salpingectomy cystoscopy Diagnosis:       Adenomyosis of uterus      (Adenomyosis of uterus [N80.03])    Surgeons: Amrik Zendejas MD Responsible Provider: Joshua Duran DO    Anesthesia Type: general ASA Status: 3            Anesthesia Type: general    Vitals Value Taken Time   /80 04/01/25 1530   Temp 36.4 04/01/25 1530   Pulse 90 04/01/25 1530   Resp 18 04/01/25 1530   SpO2 100 04/01/25 1530       Anesthesia Post Evaluation    Patient location during evaluation: bedside  Patient participation: complete - patient participated  Level of consciousness: awake and alert  Pain score: 0  Pain management: adequate  Airway patency: patent  Cardiovascular status: acceptable and stable  Respiratory status: acceptable and face mask  Hydration status: stable  Postoperative Nausea and Vomiting: none      No notable events documented.

## 2025-04-01 NOTE — H&P
History Of Present Illness  Emelia Aponte is a 44 y.o. female presenting with Adenomyosis of uterus [N80.03]Pre-op Diagnosis      * Adenomyosis of uterus [N80.03]     Past Medical History  Past Medical History:   Diagnosis Date    Abnormal uterine bleeding (AUB)     Adenomyosis of uterus     Anxiety     COVID-19     NOT VACCINATED    Reactive airway disease ([facility]HS-HCC)     Wears glasses        Surgical History  Past Surgical History:   Procedure Laterality Date    HERNIA REPAIR      NEPHRECTOMY Right     right lower lobe PARTIAL    URETEROSCOPY          Social History  She reports that she quit smoking about 2 years ago. Her smoking use included cigarettes. She started smoking about 31 years ago. She has a 28.3 pack-year smoking history. She has been exposed to tobacco smoke. She has never used smokeless tobacco. She reports that she does not drink alcohol and does not use drugs.    Family History  Family History   Problem Relation Name Age of Onset    No Known Problems Mother      Testicular cancer Father      Hypertension Maternal Grandmother      Alzheimer's disease Maternal Grandfather      Breast cancer Paternal Grandmother      Lung cancer Paternal Grandfather          Allergies  Adhesive tape-silicones, Amoxicillin, Lidoderm [lidocaine], Penicillins, and Percocet [oxycodone-acetaminophen]    Review of systems   12 point review of systems was performed and noncontributory    Physical exam  General: Appears stated age, no acute distress   Head: NCAT  Skin: Not diaphoretic, no flushing,   Eye: PERRL, EOMI   Respiratory: No respiratory distress or shortness of breath   Musculoskeletal:  BLE and BUE movement intact   Neuro: normal speech, no gait abnormalities noted  Psych: normal affect     Last Recorded Vitals  Weight 132 kg (290 lb).    Relevant Results           Assessment/Plan       ND LAPS TOTAL HYSTERECT 250 GM/< W/RMVL TUBE/OVARY [76237] (Total laparoscopic hysterectomy bilateral salpingectomy cystoscopy)          Amrik Zendejas MD

## 2025-04-01 NOTE — OP NOTE
Date: 2025  OR Location: [facility]    Name: Emelia Aponte : 1980, Age: 44 y.o., MRN: [id number], Sex: female    Diagnosis  * No surgery found * Post-op Diagnosis     * Adenomyosis of uterus [N80.03]     Procedures  Total laparoscopic hysterectomy bilateral salpingectomy cystoscopy  12840 - CT LAPS TOTAL HYSTERECT 250 GM/< W/RMVL TUBE/OVARY        Surgeons      * Amrik Zendejas - Primary    Resident/Fellow/Other Assistant:  Surgeons and Role:     * Ladonna English PA-C - Assisting    Procedure Summary  Anesthesia: General  ASA: III  Anesthesia Staff: Anesthesiologist: Joshua Duran DO  CRNA: MACARENA Newton-CRNA    Estimated Blood Loss: < 50 mL     Findings: normal anatomy      Specimens: Tubes uterus cervix      Procedure Details:    Procedure:  After informed consent the patient was brought to the operating room. The patient was intubated without complication after general anesthesia was started. She is placed in lithotomy position in yellowfin stirrups. An exam under anesthesia was performed. She was prepped draped in the normal sterile fashion with Chlorhexidene vaginal and skin prep. Timeout was performed. She was given preoperative antibiotics. Beltran was introduced into the bladder with with production of clear urine. A speculum was placed in the vagina and anterior lip of the cervix grasped with a single-tooth tenaculum. The cervix was injected with marcaine epinephrine and vasopression intracervically. The uterus was sounded and dilated a uterine manipulator was placed in the uterine cavity. Attention was then paid to the abdomen.  Marcaine was injected at the umbilicus and a scalpel incision was made midline to 15 mm down to the underlying fascia which was opened sharply, the peritoneal cavity was opened bluntly. A Single site port was placed. The peritoneum was insufflated and 1, 5 mm trochar was placed in the left lower quadrant under direct visualization.  The abdomen was surveyed  and normal anatomy was then restored. The round ligament to the utero-ovarian ligament broad ligament and uterine artery were sealed and divided away bilaterally. The bladder was backfilled to identify the edge. Bladder flap was created and came across the anterior flap. The colpotomy was then performed anteriorly and came around circumferentially. The colpotomy was able to be completed and the uterus was removed through the vagina. Suction and irrigation were performed to allow to the edges of the vagina which was then closed laparoscopically in a horizontal running fashion using the V-lock suture. The tubes were sealed and divided away and removed from the abdomen. Hemostasis was noted and all surfaces were well visualized, all irrigation fluid was removed from the peritoneum. The diaphragm was irrigated with a dilute lidocaine and bicarb solution and left in place. The rectus fascia the umbilicus was then closed. Subcutaneous tissue was irrigated and closed skin was closed on the 2 ports skin glue was placed over all incisions. The pneumo-occluder was removed from the vagina vaginoscopy done, blood clots were then removed excellent cuff closure was noted.  Cystoscopy is then performed identifying ureteral orifices bilaterally and no intrusion or distortion upon the bladder. Bilateral reflux from the ureter orifices was noted. Procedure was then ended sponge lap needle counts are reported as correct ×2. The patient was transferred to PACU in stable condition.         Amrik Zendejas MD

## 2025-04-01 NOTE — ANESTHESIA PROCEDURE NOTES
Airway  Date/Time: 4/1/2025 2:06 PM  Urgency: elective    Airway not difficult    Staffing  Performed: attending   Authorized by: Joshua Duran DO    Performed by: Joshua Duran DO  Patient location during procedure: OR    Indications and Patient Condition  Indications for airway management: anesthesia and airway protection  Spontaneous Ventilation: absent  Sedation level: deep  Preoxygenated: yes  Patient position: sniffing  MILS maintained throughout  Mask difficulty assessment: 1 - vent by mask  Planned trial extubation    Final Airway Details  Final airway type: endotracheal airway      Successful airway: ETT  Cuffed: yes   Successful intubation technique: video laryngoscopy  Facilitating devices/methods: intubating stylet  Endotracheal tube insertion site: oral  Blade: Patti [employer]  Blade size: #3  ETT size (mm): 7.0  Cormack-Lehane Classification: grade I - full view of glottis  Placement verified by: chest auscultation and capnometry   Cuff volume (mL): 7  Measured from: gums  ETT to gums (cm): 22  Number of attempts at approach: 1  Number of other approaches attempted: 0    Additional Comments  atraumatic

## 2025-04-14 ENCOUNTER — APPOINTMENT (OUTPATIENT)
Dept: OBSTETRICS AND GYNECOLOGY | Facility: CLINIC | Age: 45
End: 2025-04-14
Payer: COMMERCIAL

## 2025-04-14 VITALS — SYSTOLIC BLOOD PRESSURE: 122 MMHG | BODY MASS INDEX: 44.7 KG/M2 | DIASTOLIC BLOOD PRESSURE: 84 MMHG | WEIGHT: 293 LBS

## 2025-04-14 DIAGNOSIS — N93.9 ABNORMAL UTERINE BLEEDING (AUB): Primary | ICD-10-CM

## 2025-04-14 PROCEDURE — 99024 POSTOP FOLLOW-UP VISIT: CPT | Performed by: OBSTETRICS & GYNECOLOGY

## 2025-04-14 PROCEDURE — 1036F TOBACCO NON-USER: CPT | Performed by: OBSTETRICS & GYNECOLOGY

## 2025-04-14 ASSESSMENT — PATIENT HEALTH QUESTIONNAIRE - PHQ9
2. FEELING DOWN, DEPRESSED OR HOPELESS: NOT AT ALL
SUM OF ALL RESPONSES TO PHQ9 QUESTIONS 1 & 2: 0
1. LITTLE INTEREST OR PLEASURE IN DOING THINGS: NOT AT ALL

## 2025-04-14 NOTE — PROGRESS NOTES
GYN PROGRESS NOTE          CC:   Postop check    HPI:  Eemlia Aponte is here  for postop check.  Patient answers are not available for this visit.  HPI       Post-op     Additional comments: Est pt             Comments    Laparoscopic hysterectomy, bilateral salpingectomy.  Pt is doing well, did not take the tramadol  No pain urinating and having regular bowel movements  Chaperone Student Natalia          Last edited by Love Gonsales MA on 2025  9:32 AM.            ROS:  GEN - no fevers or chills  RESP - no SOB or cough  GYN - no AUB or pain  GI - normal BMs  URO - normal voids      HISTORY:  Past Medical History:   Diagnosis Date    Abnormal uterine bleeding (AUB)     Adenomyosis of uterus     Anxiety     COVID-19     NOT VACCINATED    Reactive airway disease (HHS-HCC)     Urinary tract infection     Wears glasses      Past Surgical History:   Procedure Laterality Date    HERNIA REPAIR      LAPAROSCOPIC HYSTERECTOMY  2025    NEPHRECTOMY Right     right lower lobe PARTIAL    SALPINGECTOMY Bilateral 2025    URETEROSCOPY      WISDOM TOOTH EXTRACTION       Social History     Socioeconomic History    Marital status: Single     Spouse name: Not on file    Number of children: Not on file    Years of education: Not on file    Highest education level: Not on file   Occupational History    Not on file   Tobacco Use    Smoking status: Former     Current packs/day: 0.00     Average packs/day: 1 pack/day for 28.3 years (28.3 ttl pk-yrs)     Types: Cigarettes     Start date:      Quit date: 2022     Years since quittin.9     Passive exposure: Past    Smokeless tobacco: Never   Vaping Use    Vaping status: Never Used   Substance and Sexual Activity    Alcohol use: Never    Drug use: Never    Sexual activity: Not Currently     Partners: Male     Birth control/protection: Abstinence   Other Topics Concern    Not on file   Social History Narrative    Not on file     Social Drivers of Health      Financial Resource Strain: Not on File (2022)    Received from [facility]    Financial Resource Strain     Financial Resource Strain: 0   Food Insecurity: Not on File (2024)    Received from [facility]    Food Insecurity     Food: 0   Transportation Needs: Not on File (2022)    Received from [facility]    Transportation Needs     Transportation: 0   Physical Activity: Not on File (2022)    Received from [facility]    Physical Activity     Physical Activity: 0   Stress: Not on File (2022)    [facility] from [facility]    Stress     Stress: 0   Social Connections: Not on File (2024)    Received from [facility]    Social Connections     Connectedness: 0   Intimate Partner Violence: Not on file   Housing Stability: Not on File (2022)    [facility] from [facility]    Housing Stability     Housin     Cancer-related family history includes Breast cancer in her paternal grandmother; Lung cancer in her paternal grandfather; Testicular cancer in her father.       PHYSICAL EXAM:  /84   Wt 133 kg (294 lb)   LMP  (LMP Unknown)   BMI 44.70 kg/m²   GEN:  A&O, NAD  ABD  NT/ND, soft, no palpable masses  INCISION:  port site(s) healing well, no separation or erythema or discharge from wound  PSYCH:  normal affect, non-anxious    Order Name Source Comment Collection Info Order Time   VERAB/VERIFY ABORH Blood, Venous **This is for confirming/verifying history of ABORh on file for transfusion of blood products. If this is not for transfusion, please order an ABO/RH [[id number]]. If you have any questions or unsure what to order, please call the blood bank.** Collected By: Karyna Chapa RN 2025 11:43 AM     Release result to [facility]   Immediate        TYPE AND SCREEN Blood, Venous As needed preprocedure ONCE for scheduled for aortic, liver, cardiac, or thoracic surgery OR hgb<7.5mg/dl and no active type and screen. RN to release order. Collected By: MAURICE Curran 2025  11:44 AM     [facility]   Immediate        SURGICAL PATHOLOGY EXAM UTERUS, CERVIX, AND BILATERAL FALLOPIAN TUBES Pre-op diagnosis:  Adenomyosis of uterus [N80.03] Collected By: Amrik Zendejas MD 4/1/2025  3:07 PM         IMPRESSION/PLAN:    Tlh   Doing well postoperatively, released from postoperative care.      Intraoperative events and findings reviewed with patient. Results of pathology--pending --reviewed with patient.  Surgical photos labelled, reviewed, and given to patient.    F/U PRN, or when next next preventative GYN exam due.      Amrik Zendejas MD

## 2025-04-21 DIAGNOSIS — R06.2 WHEEZE: ICD-10-CM

## 2025-04-21 DIAGNOSIS — J30.9 ALLERGIC RHINITIS, UNSPECIFIED SEASONALITY, UNSPECIFIED TRIGGER: ICD-10-CM

## 2025-04-21 RX ORDER — MONTELUKAST SODIUM 10 MG/1
10 TABLET ORAL DAILY
Qty: 90 TABLET | Refills: 0 | Status: SHIPPED | OUTPATIENT
Start: 2025-04-21 | End: 2026-04-21

## 2025-04-24 ENCOUNTER — TELEPHONE (OUTPATIENT)
Dept: PRIMARY CARE | Facility: CLINIC | Age: 45
End: 2025-04-24
Payer: COMMERCIAL

## 2025-04-24 DIAGNOSIS — Z78.0 MENOPAUSE: Primary | ICD-10-CM

## 2025-04-24 DIAGNOSIS — D50.9 IRON DEFICIENCY ANEMIA, UNSPECIFIED IRON DEFICIENCY ANEMIA TYPE: ICD-10-CM

## 2025-04-24 DIAGNOSIS — N80.03 ADENOMYOSIS OF UTERUS: ICD-10-CM

## 2025-04-24 NOTE — TELEPHONE ENCOUNTER
Patient would like pended labs ordered due to previous abnormal labs and prolonged bleeding after hysterectomy

## 2025-04-25 LAB
BASOPHILS # BLD AUTO: 41 CELLS/UL (ref 0–200)
BASOPHILS NFR BLD AUTO: 0.6 %
EOSINOPHIL # BLD AUTO: 173 CELLS/UL (ref 15–500)
EOSINOPHIL NFR BLD AUTO: 2.5 %
ERYTHROCYTE [DISTWIDTH] IN BLOOD BY AUTOMATED COUNT: 14.8 % (ref 11–15)
FERRITIN SERPL-MCNC: 14 NG/ML (ref 16–232)
FOLATE SERPL-MCNC: 6.7 NG/ML
HCT VFR BLD AUTO: 34.3 % (ref 35–45)
HGB BLD-MCNC: 10.8 G/DL (ref 11.7–15.5)
IRON SATN MFR SERPL: 12 % (CALC) (ref 16–45)
IRON SERPL-MCNC: 47 MCG/DL (ref 40–190)
LYMPHOCYTES # BLD AUTO: 1546 CELLS/UL (ref 850–3900)
LYMPHOCYTES NFR BLD AUTO: 22.4 %
MCH RBC QN AUTO: 24.9 PG (ref 27–33)
MCHC RBC AUTO-ENTMCNC: 31.5 G/DL (ref 32–36)
MCV RBC AUTO: 79.2 FL (ref 80–100)
MONOCYTES # BLD AUTO: 483 CELLS/UL (ref 200–950)
MONOCYTES NFR BLD AUTO: 7 %
NEUTROPHILS # BLD AUTO: 4658 CELLS/UL (ref 1500–7800)
NEUTROPHILS NFR BLD AUTO: 67.5 %
PLATELET # BLD AUTO: 218 THOUSAND/UL (ref 140–400)
PMV BLD REES-ECKER: 9.8 FL (ref 7.5–12.5)
RBC # BLD AUTO: 4.33 MILLION/UL (ref 3.8–5.1)
TIBC SERPL-MCNC: 377 MCG/DL (CALC) (ref 250–450)
VIT B12 SERPL-MCNC: 281 PG/ML (ref 200–1100)
WBC # BLD AUTO: 6.9 THOUSAND/UL (ref 3.8–10.8)

## 2025-04-29 DIAGNOSIS — D64.9 ANEMIA, UNSPECIFIED TYPE: ICD-10-CM

## 2025-05-29 ENCOUNTER — TELEPHONE (OUTPATIENT)
Dept: HEMATOLOGY/ONCOLOGY | Facility: CLINIC | Age: 45
End: 2025-05-29
Payer: COMMERCIAL

## 2025-05-29 NOTE — TELEPHONE ENCOUNTER
Detailed message was left for Emelia to see if she would like to move up her appointment to today at 1:00. Office number was left and I encouraged her to call back.I did let her know that she is still scheduled at her appointment date for 06/05

## 2025-06-02 NOTE — PROGRESS NOTES
Patient ID: Emelia Aponte is a 44 y.o. female.  Referring Physician: Abiel Morton MD  [address] [place] [address]  Primary Care Provider: Abiel Morton MD      Subjective    HPI  Emelia Aponte is a 44 y.o. female presenting for initial consultation at the request of Dr. Abiel Morton for anemia. Most recent Cbc 4/25/25 shows Hb of 10.8 g/dL, MCV 79, B12 281, ferritin 14, iron sat 12%. Recently had a hysterectomy and bilateral salpingectomy on 4/1/25.     She gives a history of adenomyosis. She denies fatigue, shortness of breath, difficulty breathing, recurrent infections, cardiac history, skin rashes, no restrictions in diet.     She has no dietary restrictions and no history of abdominal issues such as gluten sensitivity or IBS.    She currently takes iron supplements; had nausea and constipation with ferrous sulphate.     Family history:   Father - testicular cancer  Paternal grandmother - breast cancer    Social history:  Previously smoked, quit in 2022      Review of Systems:   Review of Systems:    Positive per HPI, otherwise negative.       Objective   BSA: 2.55 meters squared  /75 (BP Location: Right arm, Patient Position: Sitting)   Pulse 74   Temp 36.1 °C (97 °F) (Temporal)   Resp 16   Wt 136 kg (299 lb 2.6 oz)   LMP  (LMP Unknown)   SpO2 96%   BMI 45.49 kg/m²     Family History[1]  Oncology History    No history exists.       Emelia Aponte  reports that she quit smoking about 3 years ago. Her smoking use included cigarettes. She started smoking about 31 years ago. She has a 28.3 pack-year smoking history. She has been exposed to tobacco smoke. She has never used smokeless tobacco.  She  reports no history of alcohol use.  She  reports no history of drug use.    Physical Exam  Gen: appears well in clinic, NAD  HEENT: atraumatic head, normocephalic, EOMI, conjunctiva normal  LUNG: no increased WOB, CTAB  CV: No JVD. RRR  GI: soft, NT, ND  LE: no LE  edema  Skin: no obvious rashes or lesions on visible skin  Neuro: interactive, no focal deficits noted  Psych: normal mood and affect      Performance Status:  Symptomatic; fully ambulatory    Labs/Imaging/Pathology: Personally reviewed reports and images in Epic electronic medical record system. Pertinent results as it related to the plan represented in below in assessment and plan.     Lab Results   Component Value Date    HGB 10.8 (L) 04/25/2025    HCT 34.3 (L) 04/25/2025    MCV 79.2 (L) 04/25/2025    MCHC 31.5 (L) 04/25/2025    RBC 4.33 04/25/2025    WBC 6.9 04/25/2025    NEUTROABS 5.38 04/27/2024    IGABSOL 0.03 04/27/2024    LYMPHSABS 1.75 04/27/2024    MONOSABS 0.46 04/27/2024    EOSABS 173 04/25/2025    BASOSABS 41 04/25/2025     04/25/2025     Lab Results   Component Value Date    CREATININE 0.87 03/28/2025    BUN 13 03/28/2025    EGFR 84 03/28/2025     03/28/2025    K 4.3 03/28/2025     03/28/2025    CO2 27 03/28/2025     Lab Results   Component Value Date    ALT 11 04/27/2024    AST 12 04/27/2024    ALKPHOS 43 04/27/2024    BILITOT 0.4 04/27/2024         Assessment/Plan    Iron deficiency anemia   - Patient has long standing anemia, thought to be secondary to heavy menstrual bleeding   - She did undergo a hysterectomy on 4/1/25   - Repeat labs on 4/21/25 showed persistent anemia   - We discussed this would not be unexpected in the setting of acute blood loss from surgery   - repeat CBC today Hgb 12.1 and discussed no further hematologic workup needed    - She has started a red blood  and has been tolerating well and has noticed an improvement in her breathing   - if future if new FAYE, would consider workup for occult GI bleed   - She would be due for colonoscopy end of this year   - RTC as needed        RTC TBD based on blood work  This note has been transcribed using a medical scribe and there is a possibility of unintentional typing misprints    Diagnoses and all orders  for this visit:  Anemia, unspecified type  -     Referral To Hematology and Oncology  -     CBC and Auto Differential; Future  -     Iron and TIBC; Future  -     Ferritin; Future      Maura Arias MD  Hematology/Oncology  [facility]      [employer] Attestation  By signing my name below, I, Celia Daria Baron, attest that this documentation has been prepared under the direction and in the presence of Maura Arias MD.  Time Spent  Prep time on day of patient encounter: 5 minutes  Time spent directly with patient, family or caregiver: 25 minutes  Additional Time Spent on Patient Care Activities: 5 minutes  Documentation Time: 5 minutes  Other Time Spent: 0 minutes  Total: 40 minutes               [1]   Family History  Problem Relation Name Age of Onset    No Known Problems Mother      Testicular cancer Father      Hypertension Maternal Grandmother Lynette     Alzheimer's disease Maternal Grandfather      Breast cancer Paternal Grandmother Brissa     Lung cancer Paternal Grandfather      Hypertension Mother's Brother Bobby

## 2025-06-05 ENCOUNTER — OFFICE VISIT (OUTPATIENT)
Dept: HEMATOLOGY/ONCOLOGY | Facility: CLINIC | Age: 45
End: 2025-06-05
Payer: COMMERCIAL

## 2025-06-05 ENCOUNTER — LAB (OUTPATIENT)
Dept: LAB | Facility: CLINIC | Age: 45
End: 2025-06-05
Payer: COMMERCIAL

## 2025-06-05 VITALS
TEMPERATURE: 97 F | DIASTOLIC BLOOD PRESSURE: 75 MMHG | HEART RATE: 74 BPM | OXYGEN SATURATION: 96 % | WEIGHT: 293 LBS | RESPIRATION RATE: 16 BRPM | BODY MASS INDEX: 45.49 KG/M2 | SYSTOLIC BLOOD PRESSURE: 130 MMHG

## 2025-06-05 DIAGNOSIS — D64.9 ANEMIA, UNSPECIFIED TYPE: ICD-10-CM

## 2025-06-05 LAB
BASOPHILS # BLD AUTO: 0.05 X10*3/UL (ref 0–0.1)
BASOPHILS NFR BLD AUTO: 0.6 %
EOSINOPHIL # BLD AUTO: 0.16 X10*3/UL (ref 0–0.7)
EOSINOPHIL NFR BLD AUTO: 1.9 %
ERYTHROCYTE [DISTWIDTH] IN BLOOD BY AUTOMATED COUNT: 15.8 % (ref 11.5–14.5)
FERRITIN SERPL-MCNC: 30 NG/ML (ref 8–150)
HCT VFR BLD AUTO: 38.9 % (ref 36–46)
HGB BLD-MCNC: 12.1 G/DL (ref 12–16)
IMM GRANULOCYTES # BLD AUTO: 0.04 X10*3/UL (ref 0–0.7)
IMM GRANULOCYTES NFR BLD AUTO: 0.5 % (ref 0–0.9)
IRON SATN MFR SERPL: 7 % (ref 25–45)
IRON SERPL-MCNC: 28 UG/DL (ref 35–150)
LYMPHOCYTES # BLD AUTO: 2.08 X10*3/UL (ref 1.2–4.8)
LYMPHOCYTES NFR BLD AUTO: 24.9 %
MCH RBC QN AUTO: 25.2 PG (ref 26–34)
MCHC RBC AUTO-ENTMCNC: 31.1 G/DL (ref 32–36)
MCV RBC AUTO: 81 FL (ref 80–100)
MONOCYTES # BLD AUTO: 0.66 X10*3/UL (ref 0.1–1)
MONOCYTES NFR BLD AUTO: 7.9 %
NEUTROPHILS # BLD AUTO: 5.35 X10*3/UL (ref 1.2–7.7)
NEUTROPHILS NFR BLD AUTO: 64.2 %
PLATELET # BLD AUTO: 231 X10*3/UL (ref 150–450)
RBC # BLD AUTO: 4.81 X10*6/UL (ref 4–5.2)
TIBC SERPL-MCNC: 425 UG/DL (ref 240–445)
UIBC SERPL-MCNC: 397 UG/DL (ref 110–370)
WBC # BLD AUTO: 8.3 X10*3/UL (ref 4.4–11.3)

## 2025-06-05 PROCEDURE — 85025 COMPLETE CBC W/AUTO DIFF WBC: CPT

## 2025-06-05 PROCEDURE — 83540 ASSAY OF IRON: CPT

## 2025-06-05 PROCEDURE — 36415 COLL VENOUS BLD VENIPUNCTURE: CPT

## 2025-06-05 PROCEDURE — 99244 OFF/OP CNSLTJ NEW/EST MOD 40: CPT | Performed by: INTERNAL MEDICINE

## 2025-06-05 PROCEDURE — 99214 OFFICE O/P EST MOD 30 MIN: CPT | Mod: 25 | Performed by: INTERNAL MEDICINE

## 2025-06-05 PROCEDURE — 82728 ASSAY OF FERRITIN: CPT

## 2025-06-05 ASSESSMENT — PATIENT HEALTH QUESTIONNAIRE - PHQ9
1. LITTLE INTEREST OR PLEASURE IN DOING THINGS: NOT AT ALL
SUM OF ALL RESPONSES TO PHQ9 QUESTIONS 1 AND 2: 0
2. FEELING DOWN, DEPRESSED OR HOPELESS: NOT AT ALL

## 2025-06-05 ASSESSMENT — ENCOUNTER SYMPTOMS
OCCASIONAL FEELINGS OF UNSTEADINESS: 0
LOSS OF SENSATION IN FEET: 0
DEPRESSION: 0

## 2025-06-05 ASSESSMENT — PAIN SCALES - GENERAL: PAINLEVEL_OUTOF10: 0-NO PAIN

## 2025-06-05 NOTE — PATIENT INSTRUCTIONS
Call the office at [phone] with questions or needs.  You may also contact your nurse or doctor with non-urgent issues by sending a MyChart message.  Reminders  Medicine refills: call or send a [employer] message to request medicine refills at least 5 to 7 days before you run out.  Labs: You will need labs drawn today. We will follow up on these labs once it is resulted.

## 2025-06-10 DIAGNOSIS — Z12.31 ENCOUNTER FOR SCREENING MAMMOGRAM FOR BREAST CANCER: ICD-10-CM

## 2025-06-30 ENCOUNTER — APPOINTMENT (OUTPATIENT)
Dept: PRIMARY CARE | Facility: CLINIC | Age: 45
End: 2025-06-30
Payer: COMMERCIAL

## 2025-06-30 VITALS
BODY MASS INDEX: 44.41 KG/M2 | HEART RATE: 81 BPM | WEIGHT: 293 LBS | OXYGEN SATURATION: 97 % | SYSTOLIC BLOOD PRESSURE: 127 MMHG | DIASTOLIC BLOOD PRESSURE: 80 MMHG | HEIGHT: 68 IN

## 2025-06-30 DIAGNOSIS — Z78.0 MENOPAUSE: ICD-10-CM

## 2025-06-30 DIAGNOSIS — E78.2 MIXED DYSLIPIDEMIA: ICD-10-CM

## 2025-06-30 DIAGNOSIS — D50.9 IRON DEFICIENCY ANEMIA, UNSPECIFIED IRON DEFICIENCY ANEMIA TYPE: ICD-10-CM

## 2025-06-30 DIAGNOSIS — E55.9 VITAMIN D DEFICIENCY: ICD-10-CM

## 2025-06-30 DIAGNOSIS — E66.813 CLASS 3 SEVERE OBESITY DUE TO EXCESS CALORIES WITH SERIOUS COMORBIDITY AND BODY MASS INDEX (BMI) OF 40.0 TO 44.9 IN ADULT: ICD-10-CM

## 2025-06-30 DIAGNOSIS — J30.1 ALLERGIC RHINITIS DUE TO POLLEN, UNSPECIFIED SEASONALITY: Primary | ICD-10-CM

## 2025-06-30 PROCEDURE — 1036F TOBACCO NON-USER: CPT | Performed by: FAMILY MEDICINE

## 2025-06-30 PROCEDURE — 3008F BODY MASS INDEX DOCD: CPT | Performed by: FAMILY MEDICINE

## 2025-06-30 PROCEDURE — 99213 OFFICE O/P EST LOW 20 MIN: CPT | Performed by: FAMILY MEDICINE

## 2025-06-30 RX ORDER — PHENTERMINE HYDROCHLORIDE 37.5 MG/1
37.5 TABLET ORAL
Qty: 90 TABLET | Refills: 0 | Status: SHIPPED | OUTPATIENT
Start: 2025-06-30 | End: 2025-09-28

## 2025-06-30 ASSESSMENT — ENCOUNTER SYMPTOMS
WEAKNESS: 0
FATIGUE: 0
SORE THROAT: 0
CHEST TIGHTNESS: 0
NAUSEA: 0
NECK PAIN: 0
SHORTNESS OF BREATH: 0
BRUISES/BLEEDS EASILY: 0
COUGH: 0
VOMITING: 0
MYALGIAS: 0
EYE DISCHARGE: 0
HEADACHES: 0
CONSTIPATION: 0
ADENOPATHY: 0
DYSPHORIC MOOD: 0
NUMBNESS: 0
DIZZINESS: 0
NERVOUS/ANXIOUS: 0
BACK PAIN: 0
ABDOMINAL PAIN: 0
HEMATURIA: 0
ACTIVITY CHANGE: 0
DIFFICULTY URINATING: 0
ARTHRALGIAS: 0
DIARRHEA: 0
BLOOD IN STOOL: 0

## 2025-06-30 NOTE — PROGRESS NOTES
"Subjective   Patient ID: Emelia Aponte is a 44 y.o. female who presents for Follow-up.  HPI  Menopause stable  Seen by gynecology  Status post surgery for adenomyosis  Status post DEXA scan    Seasonal allergies under control at this time  Better this time a year    High cholesterol ongoing  Follow nutrition  Watch diet follow blood work    Low vitamin D recommend replace    Obese condition continues  Discussed various weight loss options  She would like to try Adipex  Discussed risks and benefits  Recommend weight loss and diet    Recommend update general blood work as well  Review of Systems   Constitutional:  Negative for activity change and fatigue.   HENT:  Negative for congestion and sore throat.    Eyes:  Negative for discharge.   Respiratory:  Negative for cough, chest tightness and shortness of breath.    Cardiovascular:  Negative for chest pain and leg swelling.   Gastrointestinal:  Negative for abdominal pain, blood in stool, constipation, diarrhea, nausea and vomiting.   Endocrine: Negative for cold intolerance and heat intolerance.   Genitourinary:  Negative for difficulty urinating and hematuria.   Musculoskeletal:  Negative for arthralgias, back pain, gait problem, myalgias and neck pain.   Allergic/Immunologic: Negative for environmental allergies.   Neurological:  Negative for dizziness, syncope, weakness, numbness and headaches.   Hematological:  Negative for adenopathy. Does not bruise/bleed easily.   Psychiatric/Behavioral:  Negative for dysphoric mood. The patient is not nervous/anxious.    All other systems reviewed and are negative.      Objective   /80 (BP Location: Left arm, BP Cuff Size: Large adult long)   Pulse 81   Ht 1.727 m (5' 8\")   Wt 133 kg (294 lb)   LMP  (LMP Unknown)   SpO2 97%   BMI 44.70 kg/m²    Physical Exam  Vitals and nursing note reviewed.   Constitutional:       General: She is not in acute distress.     Appearance: Normal appearance. She is obese.   HENT:    "   Head: Normocephalic and atraumatic.      Right Ear: Tympanic membrane, ear canal and external ear normal.      Left Ear: Tympanic membrane, ear canal and external ear normal.      Nose: Nose normal.      Mouth/Throat:      Mouth: Mucous membranes are moist.      Pharynx: Oropharynx is clear. No oropharyngeal exudate or posterior oropharyngeal erythema.   Eyes:      Extraocular Movements: Extraocular movements intact.      Conjunctiva/sclera: Conjunctivae normal.      Pupils: Pupils are equal, round, and reactive to light.   Cardiovascular:      Rate and Rhythm: Normal rate and regular rhythm.      Pulses: Normal pulses.      Heart sounds: Normal heart sounds. No murmur heard.  Pulmonary:      Effort: Pulmonary effort is normal. No respiratory distress.      Breath sounds: Normal breath sounds. No wheezing or rales.   Abdominal:      General: Abdomen is flat. Bowel sounds are normal. There is no distension.      Palpations: Abdomen is soft. There is no mass.      Tenderness: There is no abdominal tenderness.   Musculoskeletal:         General: No swelling or deformity. Normal range of motion.      Cervical back: Normal range of motion and neck supple.      Right lower leg: No edema.      Left lower leg: No edema.   Lymphadenopathy:      Cervical: No cervical adenopathy.   Skin:     General: Skin is warm and dry.      Capillary Refill: Capillary refill takes less than 2 seconds.      Findings: No lesion or rash.   Neurological:      General: No focal deficit present.      Mental Status: She is alert and oriented to person, place, and time.      Cranial Nerves: No cranial nerve deficit.      Motor: No weakness.   Psychiatric:         Mood and Affect: Mood normal.         Behavior: Behavior normal.         Thought Content: Thought content normal.         Judgment: Judgment normal.         Assessment/Plan   Problem List Items Addressed This Visit       Vitamin D deficiency    Relevant Orders    Vitamin D  25-Hydroxy,Total (for eval of Vitamin D levels)    Allergic rhinitis - Primary    Menopause    Class 3 severe obesity due to excess calories with serious comorbidity and body mass index (BMI) of 40.0 to 44.9 in adult    Relevant Medications    phentermine (Adipex-P) 37.5 mg tablet    Other Relevant Orders    Follow Up In [facility] - PCP     Other Visit Diagnoses         Iron deficiency anemia, unspecified iron deficiency anemia type          Mixed dyslipidemia        Relevant Orders    Comprehensive Metabolic Panel    Lipid Panel            Patient education provided.  Stay current with age appropriate health maintenance as instructed.  Appointment here or ER with new or worsening symptoms'  Keep appropriate follow-up visit.  Stay current with proper immunizations   Blood work as above  Meds as above  Weight loss and diet  Recheck 3 months

## 2025-07-29 DIAGNOSIS — J30.9 ALLERGIC RHINITIS, UNSPECIFIED SEASONALITY, UNSPECIFIED TRIGGER: ICD-10-CM

## 2025-07-29 DIAGNOSIS — R06.2 WHEEZE: ICD-10-CM

## 2025-07-29 RX ORDER — MONTELUKAST SODIUM 10 MG/1
10 TABLET ORAL DAILY
Qty: 90 TABLET | Refills: 1 | Status: SHIPPED | OUTPATIENT
Start: 2025-07-29 | End: 2026-07-29

## 2025-07-29 NOTE — TELEPHONE ENCOUNTER
Rx Refill Request     Name: Emelia Aponte  :  1980     Date of last appointment:  2025   Date of next appointment:  Visit date not found   Best number to reach patient:  [phone]

## 2025-08-05 DIAGNOSIS — J30.9 ALLERGIC RHINITIS, UNSPECIFIED SEASONALITY, UNSPECIFIED TRIGGER: Primary | ICD-10-CM

## 2025-08-05 RX ORDER — AZITHROMYCIN 250 MG/1
TABLET, FILM COATED ORAL
Qty: 6 TABLET | Refills: 0 | Status: SHIPPED | OUTPATIENT
Start: 2025-08-05 | End: 2025-08-10

## 2025-08-05 NOTE — TELEPHONE ENCOUNTER
Patient asking for ATB.   Patient's symptoms are sinus pressure, headache,green nasal discharge, congestion, cough, covid negative test x 3. No fever.

## (undated) DEVICE — Device

## (undated) DEVICE — GOWN, SURGICAL, ROYAL SILK, LG, STERILE

## (undated) DEVICE — TOWEL PACK, STERILE, 16X24, XRAY DETECTABLE, BLUE, 4/PK

## (undated) DEVICE — DRAPE PACK, LAVH, W/ATTACHED LEGGINGS, W/POUCH, 100 X 114 IN, LF, STERILE

## (undated) DEVICE — LIGASURE, L-HOOK 44CM SEALER/DIVIDER LAP, MARYLAND

## (undated) DEVICE — SUTURE, RELOAD, ENDOSTITCH 0, V-LOC 6IN  X 15CM

## (undated) DEVICE — TRAY, DRY PREP, PREMIUM

## (undated) DEVICE — TIP, RUMI BLUE 6.7MM X 8CM

## (undated) DEVICE — SYRINGE, CONTROL, ANGIOGRAPHIC, FIXED MALE LUER, 10 CC

## (undated) DEVICE — SOLUTION, IRRIGATION, SODIUM CHLORIDE 0.9%, 1000 ML, POUR BOTTLE

## (undated) DEVICE — SOLUTION, IRRIGATION, USP, SODIUM CHLORIDE 0.9%, 3000 ML

## (undated) DEVICE — NEEDLE, SAFETY, 25 GA X 1.5 IN

## (undated) DEVICE — COVER, BACK TABLE

## (undated) DEVICE — SUTURE, MONOCRYL, 4-0, 27 IN, PS-2, UNDYED

## (undated) DEVICE — MANIPULATOR, UTERINE, ARCH KOH EFFICIENT, 3.5CM

## (undated) DEVICE — WARMER, DUAL SCOPE

## (undated) DEVICE — GLOVE, SURGICAL, PROTEXIS PI , 7.5, PF, LF

## (undated) DEVICE — GOWN, SURGICAL, ROYAL SILK, XL, STERILE

## (undated) DEVICE — PAD, SANITARY, OBSTETRICAL, W/ADHSV STRIP,11 IN,LF

## (undated) DEVICE — POSITIONING SYSTEM, PAGAZZI, PATIENT

## (undated) DEVICE — PREP, SCRUB, SKIN, FOAM, HIBICLENS, 4 OZ

## (undated) DEVICE — BOWL, BASIN, 32 OZ, STERILE

## (undated) DEVICE — DRAPE, POUCH, IRRIGATION, 20 X 24, W/FILTER DRAINAGE

## (undated) DEVICE — ADHESIVE, SKIN, DERMABOND ADVANCED, 15CM, PEN-STYLE

## (undated) DEVICE — TRAY, SURESTEP, SILICONE DRAINAGE BAG, STATLOCK, 16FR

## (undated) DEVICE — SYRINGE, 50 CC, LUER LOCK

## (undated) DEVICE — SYSTEM, SMOKE EVAC, SEECLEAR XCL, 8.0 LITER, LF

## (undated) DEVICE — MANIFOLD, 4 PORT NEPTUNE STANDARD

## (undated) DEVICE — SUTURE, VICRYL PLUS, 0, 27IN, UR-6, VIOLET, BRAIDED

## (undated) DEVICE — APPLICATOR, CHLORAPREP, W/ORANGE TINT, 26ML

## (undated) DEVICE — HOLSTER, JET SAFETY

## (undated) DEVICE — SUTURE, STRATAFIX 0, SPIRAL PDS PLUS, 9IN V-34 36CM

## (undated) DEVICE — GOWN, SURGICAL, IMPLT, BACK, XLARGE, XLONG, STERILE

## (undated) DEVICE — NEEDLE, SPINAL, 22 G X 3.5 IN, BLACK HUB

## (undated) DEVICE — DUPLOSPRAY, MIS SNAP LOCK, 40CM

## (undated) DEVICE — ENDOSCOPE, ASCOPE, CYSCO, DISP

## (undated) DEVICE — PUMP, STRYKERFLOW 2 & HANDPIECE W/10FT. IRRIGATION TUBING

## (undated) DEVICE — IRRIGATION SET, CYSTOSCOPY, REGULATING CLAMP, STRAIGHT, 81 IN

## (undated) DEVICE — SYSTEM, FIOS FIRST ENTRY, 5 X 100MM, KII ADVANCED FIXATION

## (undated) DEVICE — DRAPE, LEGGINGS, 28.5 X 43 IN, DISPOSABLE, LF, STERILE

## (undated) DEVICE — DEVICE, SUTURE, ENDOSTITCH, 10 MM